# Patient Record
Sex: FEMALE | Race: WHITE | Employment: OTHER | URBAN - NONMETROPOLITAN AREA
[De-identification: names, ages, dates, MRNs, and addresses within clinical notes are randomized per-mention and may not be internally consistent; named-entity substitution may affect disease eponyms.]

---

## 2021-01-04 ENCOUNTER — APPOINTMENT (OUTPATIENT)
Dept: CT IMAGING | Age: 66
End: 2021-01-04
Payer: MEDICARE

## 2021-01-04 ENCOUNTER — HOSPITAL ENCOUNTER (EMERGENCY)
Age: 66
Discharge: ANOTHER HOSPITAL | End: 2021-01-04
Attending: EMERGENCY MEDICINE
Payer: MEDICARE

## 2021-01-04 ENCOUNTER — APPOINTMENT (OUTPATIENT)
Dept: GENERAL RADIOLOGY | Age: 66
End: 2021-01-04
Payer: MEDICARE

## 2021-01-04 VITALS
RESPIRATION RATE: 18 BRPM | BODY MASS INDEX: 28.93 KG/M2 | HEART RATE: 57 BPM | HEIGHT: 66 IN | TEMPERATURE: 97.6 F | DIASTOLIC BLOOD PRESSURE: 81 MMHG | OXYGEN SATURATION: 97 % | WEIGHT: 180 LBS | SYSTOLIC BLOOD PRESSURE: 136 MMHG

## 2021-01-04 DIAGNOSIS — G93.89 BRAIN MASS: Primary | ICD-10-CM

## 2021-01-04 LAB
ANION GAP SERPL CALCULATED.3IONS-SCNC: 1 MMOL/L (ref 4–16)
BASOPHILS ABSOLUTE: 0 K/CU MM
BASOPHILS RELATIVE PERCENT: 0.2 % (ref 0–1)
BUN BLDV-MCNC: 11 MG/DL (ref 6–23)
CALCIUM SERPL-MCNC: 9.2 MG/DL (ref 8.3–10.6)
CHLORIDE BLD-SCNC: 103 MMOL/L (ref 99–110)
CO2: 37 MMOL/L (ref 21–32)
CREAT SERPL-MCNC: 1.1 MG/DL (ref 0.6–1.1)
DIFFERENTIAL TYPE: ABNORMAL
EOSINOPHILS ABSOLUTE: 0.1 K/CU MM
EOSINOPHILS RELATIVE PERCENT: 1.4 % (ref 0–3)
GFR AFRICAN AMERICAN: 60 ML/MIN/1.73M2
GFR NON-AFRICAN AMERICAN: 50 ML/MIN/1.73M2
GLUCOSE BLD-MCNC: 81 MG/DL (ref 70–99)
GLUCOSE BLD-MCNC: 82 MG/DL (ref 70–99)
HCT VFR BLD CALC: 40.4 % (ref 37–47)
HEMOGLOBIN: 13.3 GM/DL (ref 12.5–16)
IMMATURE NEUTROPHIL %: 0.2 % (ref 0–0.43)
INR BLD: 1.12 INDEX
LYMPHOCYTES ABSOLUTE: 1.4 K/CU MM
LYMPHOCYTES RELATIVE PERCENT: 29.4 % (ref 24–44)
MCH RBC QN AUTO: 34 PG (ref 27–31)
MCHC RBC AUTO-ENTMCNC: 32.9 % (ref 32–36)
MCV RBC AUTO: 103.3 FL (ref 78–100)
MONOCYTES ABSOLUTE: 0.3 K/CU MM
MONOCYTES RELATIVE PERCENT: 6.4 % (ref 0–4)
PDW BLD-RTO: 13 % (ref 11.7–14.9)
PLATELET # BLD: 198 K/CU MM (ref 140–440)
PMV BLD AUTO: 9.7 FL (ref 7.5–11.1)
POTASSIUM SERPL-SCNC: 4.1 MMOL/L (ref 3.5–5.1)
PROTHROMBIN TIME: 12.8 SECONDS (ref 11.7–14.5)
RBC # BLD: 3.91 M/CU MM (ref 4.2–5.4)
SEGMENTED NEUTROPHILS ABSOLUTE COUNT: 3 K/CU MM
SEGMENTED NEUTROPHILS RELATIVE PERCENT: 62.4 % (ref 36–66)
SODIUM BLD-SCNC: 141 MMOL/L (ref 135–145)
TOTAL IMMATURE NEUTOROPHIL: 0.01 K/CU MM
WBC # BLD: 4.8 K/CU MM (ref 4–10.5)

## 2021-01-04 PROCEDURE — 96375 TX/PRO/DX INJ NEW DRUG ADDON: CPT

## 2021-01-04 PROCEDURE — 2580000003 HC RX 258: Performed by: EMERGENCY MEDICINE

## 2021-01-04 PROCEDURE — 93005 ELECTROCARDIOGRAM TRACING: CPT | Performed by: EMERGENCY MEDICINE

## 2021-01-04 PROCEDURE — 93010 ELECTROCARDIOGRAM REPORT: CPT | Performed by: INTERNAL MEDICINE

## 2021-01-04 PROCEDURE — 6360000002 HC RX W HCPCS: Performed by: EMERGENCY MEDICINE

## 2021-01-04 PROCEDURE — 80048 BASIC METABOLIC PNL TOTAL CA: CPT

## 2021-01-04 PROCEDURE — 85610 PROTHROMBIN TIME: CPT

## 2021-01-04 PROCEDURE — 71045 X-RAY EXAM CHEST 1 VIEW: CPT

## 2021-01-04 PROCEDURE — 96365 THER/PROPH/DIAG IV INF INIT: CPT

## 2021-01-04 PROCEDURE — 70450 CT HEAD/BRAIN W/O DYE: CPT

## 2021-01-04 PROCEDURE — 82962 GLUCOSE BLOOD TEST: CPT

## 2021-01-04 PROCEDURE — 99282 EMERGENCY DEPT VISIT SF MDM: CPT

## 2021-01-04 PROCEDURE — 6360000004 HC RX CONTRAST MEDICATION: Performed by: EMERGENCY MEDICINE

## 2021-01-04 PROCEDURE — 85025 COMPLETE CBC W/AUTO DIFF WBC: CPT

## 2021-01-04 PROCEDURE — 70496 CT ANGIOGRAPHY HEAD: CPT

## 2021-01-04 RX ORDER — LORAZEPAM 2 MG/ML
1 INJECTION INTRAMUSCULAR ONCE
Status: COMPLETED | OUTPATIENT
Start: 2021-01-04 | End: 2021-01-04

## 2021-01-04 RX ORDER — DEXAMETHASONE SODIUM PHOSPHATE 4 MG/ML
4 INJECTION, SOLUTION INTRA-ARTICULAR; INTRALESIONAL; INTRAMUSCULAR; INTRAVENOUS; SOFT TISSUE ONCE
Status: COMPLETED | OUTPATIENT
Start: 2021-01-04 | End: 2021-01-04

## 2021-01-04 RX ADMIN — DEXAMETHASONE SODIUM PHOSPHATE 4 MG: 4 INJECTION, SOLUTION INTRAMUSCULAR; INTRAVENOUS at 12:18

## 2021-01-04 RX ADMIN — LEVETIRACETAM 1500 MG: 100 INJECTION, SOLUTION INTRAVENOUS at 12:25

## 2021-01-04 RX ADMIN — LORAZEPAM 1 MG: 2 INJECTION INTRAMUSCULAR; INTRAVENOUS at 12:19

## 2021-01-04 RX ADMIN — IOPAMIDOL 75 ML: 755 INJECTION, SOLUTION INTRAVENOUS at 11:55

## 2021-01-04 NOTE — ED NOTES
Mey Win here for transfer to [address] [place] [address].       John Clemente RN  01/04/21 0607

## 2021-01-04 NOTE — ED NOTES
Bed: 05  Expected date:   Expected time:   Means of arrival:   Comments:  EMS - 10 Brandi Denton, RN  01/04/21 1113

## 2021-01-04 NOTE — ED NOTES
[facility] on their way here.  Going to [facility] ED main Accepted by Dr. Salvador Maddox  01/04/21 6254

## 2021-01-04 NOTE — ED NOTES
Called [facility]  As patient is an employee here to get an emergency contact. Daija Dueñas was emergency contact that is listed and phone number is [phone]. This nurse attempted to call and message left to please call [phone].      Cliff Champagne RN  01/04/21 1625

## 2021-01-04 NOTE — ED PROVIDER NOTES
Triage Chief Complaint:   Altered Mental Status    [place]:  Raj Theodore is a 77 y.o. female that presents ED by EMS. 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 She follows commands denies any fall change or trauma she is on no antiplatelet antithrombin inhibitor    No past medical history on file. No past surgical history on file. No family history on file.   Social History     Socioeconomic History    Marital status:      Spouse name: Not on file    Number of children: Not on file    Years of education: Not on file    Highest education level: Not on file   Occupational History    Not on file   Social Needs    Financial resource strain: Not on file    Food insecurity     Worry: Not on file     Inability: Not on file    Transportation needs     Medical: Not on file     Non-medical: Not on file   Tobacco Use    Smoking status: Not on file   Substance and Sexual Activity    Alcohol use: Not on file    Drug use: Not on file    Sexual activity: Not on file   Lifestyle    Physical activity     Days per week: Not on file     Minutes per session: Not on file    Stress: Not on file   Relationships    Social connections     Talks on phone: Not on file     Gets together: Not on file     Attends Yazidi service: Not on file     Active member of club or organization: Not on file     Attends meetings of clubs or organizations: Not on file     Relationship status: Not on file    Intimate partner violence     Fear of current or ex partner: Not on file     Emotionally abused: Not on file     Physically abused: Not on file     Forced sexual activity: Not on file   Other Topics Concern    Not on file   Social History Narrative    Not on file     Current Facility-Administered Medications   Medication Dose Route Frequency Provider Last Rate Last Admin    levETIRAcetam (KEPPRA) 1,500 mg in sodium chloride 0.9 % 100 mL IVPB  1,500 mg Intravenous Once Darcy Chahal DO   1,500 mg at 01/04/21 1225     No current outpatient medications on file. Not on File      ROS:    Review of Systems   Neurological: Positive for facial asymmetry and weakness. Negative for dizziness, tremors, seizures, syncope, speech difficulty, light-headedness, numbness and headaches. All other systems reviewed and are negative. Nursing Notes Reviewed    Physical Exam:  ED Triage Vitals   Enc Vitals Group      BP       Pulse       Resp       Temp       Temp src       SpO2       Weight       Height       Head Circumference       Peak Flow       Pain Score       Pain Loc       Pain Edu? Excl. in [address]? Physical Exam  Vitals signs and nursing note reviewed. Constitutional:       General: She is in acute distress. Appearance: She is well-developed. She is ill-appearing and toxic-appearing. HENT:      Head: Normocephalic and atraumatic. Right Ear: External ear normal.      Left Ear: External ear normal.      Mouth/Throat:      Mouth: Mucous membranes are moist.   Eyes:      General: No visual field deficit or scleral icterus. Right eye: No discharge. Left eye: No discharge. Extraocular Movements:      Right eye: Normal extraocular motion. Conjunctiva/sclera: Conjunctivae normal.      Pupils: Pupils are equal, round, and reactive to light. Neck:      Musculoskeletal: Normal range of motion and neck supple. Thyroid: No thyromegaly. Vascular: No JVD. Trachea: No tracheal deviation. Cardiovascular:      Rate and Rhythm: Normal rate and regular rhythm. Heart sounds: Normal heart sounds. No murmur. No friction rub. No gallop.     Pulmonary:      Effort: Pulmonary effort is normal. No respiratory distress. Breath sounds: Normal breath sounds. No stridor. No wheezing or rales. Chest:      Chest wall: No tenderness. Abdominal:      General: Bowel sounds are normal. There is no distension. Palpations: Abdomen is soft. There is no mass. Tenderness: There is no abdominal tenderness. There is no guarding or rebound. Hernia: No hernia is present. Musculoskeletal: Normal range of motion. General: No tenderness or deformity. Lymphadenopathy:      Cervical: No cervical adenopathy. Skin:     General: Skin is warm and dry. Coloration: Skin is not pale. Findings: No erythema or rash. Neurological:      Mental Status: She is alert and oriented to person, place, and time. GCS: GCS eye subscore is 4. GCS verbal subscore is 5. GCS motor subscore is 6. Cranial Nerves: Cranial nerve deficit, dysarthria and facial asymmetry present. Sensory: Sensory deficit present. Motor: Weakness present. Deep Tendon Reflexes: Reflexes are normal and symmetric.  Reflexes normal.      Comments: Milka Morrow NIH Stroke Scale at 11:26 AM is:  Level of Consciousness:  0 - alert; keenly responsive    LOC Questions:  0 - answers both questions correctly    LOC Commands:  0 - performs both tasks correctly    Best Gaze:  0 - normal    Visual Fields:  0 - no visual loss    Facial Palsy:  2 - partial paralysis (total or near total paralysis of the lower face)    Motor-Arm-Left:  0 - no drift, limb holds 90 (or 45) degrees for full 10 seconds    Motor-Leg-Left:  0 - no drift; leg holds 30 degree position for full 5 seconds    Motor-Arm-Right:  0 - no drift, limb holds 90 (or 45) degrees for full 10 seconds    Motor-Leg-Right:  0 - no drift; leg holds 30 degree position for full 5 seconds    Limb Ataxia:  0 - absent    Sensory:  1 - mild to moderate sensory loss; patient feels pinprick is less sharp or is dull on the affected side; there is a loss of superficial pain with pinprick but patient is aware of being touched     Best Language:  0 - no aphasia, normal    Dysarthria:  1 - mild to moderate, patient slurs at least some words and at worst, can be understood with some difficulty    Extinction and Inattention:  0 - no abnormality     Psychiatric:         Mood and Affect: Mood normal.         Speech: Speech is delayed and slurred. Behavior: Behavior normal.         Thought Content: Thought content normal.         Judgment: Judgment normal.         I have reviewed and interpreted all of the currently available lab results from this visit (ifapplicable):  Results for orders placed or performed during the hospital encounter of 01/04/21   POCT Glucose   Result Value Ref Range    POC Glucose 81 70 - 99 MG/DL   EKG 12 Lead   Result Value Ref Range    Ventricular Rate 57 BPM    Atrial Rate 57 BPM    P-R Interval 188 ms    QRS Duration 108 ms    Q-T Interval 432 ms    QTc Calculation (Bazett) 420 ms    P Axis 79 degrees    R Axis -6 degrees    T Axis 20 degrees    Diagnosis       Sinus bradycardia  Possible Inferior infarct , age undetermined  Abnormal ECG  No previous ECGs available        Radiographs (if obtained):  [] The following radiograph wasinterpreted by myself in the absence of a radiologist:   [] Radiologist's Report Reviewed:  CTA HEAD NECK W CONTRAST   Preliminary Result   No acute arterial abnormality or hemodynamically significant stenosis in the   head or neck. Ectatic ascending thoracic aorta measuring 4.4 cm in diameter. XR CHEST PORTABLE   Final Result   Peripheral bilateral pulmonary opacities with bibasilar hypoaeration could   represent subsegmental atelectasis or atypical pneumonia         CT HEAD WO CONTRAST   Preliminary Result   1. Left frontal lobe mass measures 3.2 x 3.1 x 2.9 cm with surrounding   vasogenic edema. Differential diagnosis includes primary brain neoplasm or   metastatic disease.   MRI of the brain without and with contrast recommended   for further characterization. 2. No acute intracranial hemorrhage. Critical results were called by Dr. Maricel Hollingsworth MD to Manfred Hoffmann on 1/4/2021 at 11:52. EKG (if obtained): (All EKG's are interpreted by myself in the absence of a cardiologist)      The 12 lead EKG was interpreted by me, and the interpretation is as follows:  normal sinus rhythm and sinus bradycardia, rate=57, rate = 57. Intervals are within the normal range. QTc is not prolonged. ST elevations are not present. T wave inversions are not present. Non-specific T wave changes are not present. Delta waves, Brugada Syndrome, and Short VA are not present. There is no acute ischemia. Chart review shows recent radiographs:  No results found. MDM:    Presents to the ED with a change in mental status when I saw her she was slow to respond and had an extra score for me of 4. The patient could follow command she could speak it was understandable but slightly slurred. Stat CT of the head unfortunately with a left frontal lobe mass some vasogenic edema and slight shift. CTA is negative. I spoke to the stroke network physician at [facility]. Patient did have a little chewing meat of her chin concerning for bulbar symptoms for seizure she was given a dose of lorazepam loaded with Keppra. The patient was given 4 mg of Decadron and will be transferred to [facility] for definitive care    The total Critical Care time is 60 minutes which excludes separately billable procedures. ED Course as of Jan 04 1231   [facility] Jan 04, 2021   1150 Call from RAD>>>    [PW]   1150 Call from RAD>>> ? Mass L frontal lobe; minimal midline shift. [PW]      ED Course User Index  [PW] Leah Nguyen DO         Clinical Impression:  1. Brain mass      Disposition referral (if applicable):  No follow-up provider specified.   Disposition medications (if applicable):  New Prescriptions    No medications on file           Amytine Jamil Ivan DO, FACEP      Comment: Please note this report has been produced using speech recognition software and maycontain errors related to that system including errors in grammar, punctuation, and spelling, as well as words and phrases that may be inappropriate. If there are any questions or concerns please feel free to contact thedictating provider for clarification.         Mack Stevens DO  01/04/21 6190

## 2021-01-04 NOTE — ED NOTES
Pt making chewing motions with her mouth. Asked if she had chewing gum. Pt indicated no.        Jackelyn Brumfield RN  01/04/21 4337

## 2021-01-07 LAB
EKG ATRIAL RATE: 57 BPM
EKG DIAGNOSIS: NORMAL
EKG P AXIS: 79 DEGREES
EKG P-R INTERVAL: 188 MS
EKG Q-T INTERVAL: 432 MS
EKG QRS DURATION: 108 MS
EKG QTC CALCULATION (BAZETT): 420 MS
EKG R AXIS: -6 DEGREES
EKG T AXIS: 20 DEGREES
EKG VENTRICULAR RATE: 57 BPM

## 2022-05-11 ENCOUNTER — APPOINTMENT (OUTPATIENT)
Dept: CT IMAGING | Age: 67
End: 2022-05-11
Payer: COMMERCIAL

## 2022-05-11 ENCOUNTER — APPOINTMENT (OUTPATIENT)
Dept: GENERAL RADIOLOGY | Age: 67
End: 2022-05-11
Payer: COMMERCIAL

## 2022-05-11 ENCOUNTER — HOSPITAL ENCOUNTER (OUTPATIENT)
Age: 67
Setting detail: OBSERVATION
Discharge: HOSPICE | End: 2022-05-12
Attending: EMERGENCY MEDICINE
Payer: COMMERCIAL

## 2022-05-11 DIAGNOSIS — S22.41XA CLOSED FRACTURE OF MULTIPLE RIBS OF RIGHT SIDE, INITIAL ENCOUNTER: ICD-10-CM

## 2022-05-11 DIAGNOSIS — C71.9 GLIOBLASTOMA MULTIFORME (HCC): ICD-10-CM
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
## 2022-05-11 PROBLEM — S22.39XA CLOSED FRACTURE OF ONE RIB: Status: ACTIVE | Noted: 2022-05-11

## 2022-05-11 PROBLEM — R26.9 GAIT DIFFICULTY: Status: ACTIVE | Noted: 2021-01-26

## 2022-05-11 PROBLEM — I48.0 PAROXYSMAL ATRIAL FIBRILLATION (HCC): Status: ACTIVE | Noted: 2022-05-11

## 2022-05-11 PROBLEM — D49.6 BRAIN TUMOR (HCC): Status: ACTIVE | Noted: 2021-01-04

## 2022-05-11 PROBLEM — R53.0 NEOPLASTIC MALIGNANT RELATED FATIGUE: Status: ACTIVE | Noted: 2021-07-11

## 2022-05-11 PROBLEM — R47.01 APHASIA DUE TO NEURO-ONCOLOGY DIAGNOSIS: Status: ACTIVE | Noted: 2021-01-26

## 2022-05-11 PROBLEM — I71.20 THORACIC AORTIC ANEURYSM WITHOUT RUPTURE: Status: ACTIVE | Noted: 2021-04-20

## 2022-05-11 PROBLEM — R91.8 LUNG MASS: Status: ACTIVE | Noted: 2021-01-04

## 2022-05-11 LAB
ALBUMIN SERPL-MCNC: 4.2 GM/DL (ref 3.4–5)
ALP BLD-CCNC: 50 IU/L (ref 40–129)
ALT SERPL-CCNC: 13 U/L (ref 10–40)
ANION GAP SERPL CALCULATED.3IONS-SCNC: 9 MMOL/L (ref 4–16)
AST SERPL-CCNC: 11 IU/L (ref 15–37)
BASOPHILS ABSOLUTE: 0 K/CU MM
BASOPHILS RELATIVE PERCENT: 0.1 % (ref 0–1)
BILIRUB SERPL-MCNC: 0.4 MG/DL (ref 0–1)
BUN BLDV-MCNC: 17 MG/DL (ref 6–23)
CALCIUM SERPL-MCNC: 9 MG/DL (ref 8.3–10.6)
CHLORIDE BLD-SCNC: 107 MMOL/L (ref 99–110)
CO2: 27 MMOL/L (ref 21–32)
CREAT SERPL-MCNC: 0.6 MG/DL (ref 0.6–1.1)
DIFFERENTIAL TYPE: ABNORMAL
EOSINOPHILS ABSOLUTE: 0.1 K/CU MM
EOSINOPHILS RELATIVE PERCENT: 0.7 % (ref 0–3)
GFR AFRICAN AMERICAN: >60 ML/MIN/1.73M2
GFR NON-AFRICAN AMERICAN: >60 ML/MIN/1.73M2
GLUCOSE BLD-MCNC: 97 MG/DL (ref 70–99)
HCT VFR BLD CALC: 43.3 % (ref 37–47)
HEMOGLOBIN: 14.4 GM/DL (ref 12.5–16)
IMMATURE NEUTROPHIL %: 0.8 % (ref 0–0.43)
LYMPHOCYTES ABSOLUTE: 1 K/CU MM
LYMPHOCYTES RELATIVE PERCENT: 10.9 % (ref 24–44)
MCH RBC QN AUTO: 33 PG (ref 27–31)
MCHC RBC AUTO-ENTMCNC: 33.3 % (ref 32–36)
MCV RBC AUTO: 99.1 FL (ref 78–100)
MONOCYTES ABSOLUTE: 0.7 K/CU MM
MONOCYTES RELATIVE PERCENT: 7.6 % (ref 0–4)
PDW BLD-RTO: 12.5 % (ref 11.7–14.9)
PLATELET # BLD: 179 K/CU MM (ref 140–440)
PMV BLD AUTO: 9.4 FL (ref 7.5–11.1)
POTASSIUM SERPL-SCNC: 3.8 MMOL/L (ref 3.5–5.1)
RBC # BLD: 4.37 M/CU MM (ref 4.2–5.4)
SEGMENTED NEUTROPHILS ABSOLUTE COUNT: 7.1 K/CU MM
SEGMENTED NEUTROPHILS RELATIVE PERCENT: 79.9 % (ref 36–66)
SODIUM BLD-SCNC: 143 MMOL/L (ref 135–145)
TOTAL IMMATURE NEUTOROPHIL: 0.07 K/CU MM
TOTAL PROTEIN: 6.3 GM/DL (ref 6.4–8.2)
WBC # BLD: 8.9 K/CU MM (ref 4–10.5)

## 2022-05-11 PROCEDURE — 96375 TX/PRO/DX INJ NEW DRUG ADDON: CPT

## 2022-05-11 PROCEDURE — 72131 CT LUMBAR SPINE W/O DYE: CPT

## 2022-05-11 PROCEDURE — 85025 COMPLETE CBC W/AUTO DIFF WBC: CPT

## 2022-05-11 PROCEDURE — 6370000000 HC RX 637 (ALT 250 FOR IP): Performed by: NURSE PRACTITIONER

## 2022-05-11 PROCEDURE — 80053 COMPREHEN METABOLIC PANEL: CPT

## 2022-05-11 PROCEDURE — G0378 HOSPITAL OBSERVATION PER HR: HCPCS

## 2022-05-11 PROCEDURE — 71045 X-RAY EXAM CHEST 1 VIEW: CPT

## 2022-05-11 PROCEDURE — 96361 HYDRATE IV INFUSION ADD-ON: CPT

## 2022-05-11 PROCEDURE — 96372 THER/PROPH/DIAG INJ SC/IM: CPT

## 2022-05-11 PROCEDURE — 96374 THER/PROPH/DIAG INJ IV PUSH: CPT

## 2022-05-11 PROCEDURE — 72125 CT NECK SPINE W/O DYE: CPT

## 2022-05-11 PROCEDURE — 2580000003 HC RX 258: Performed by: NURSE PRACTITIONER

## 2022-05-11 PROCEDURE — 99285 EMERGENCY DEPT VISIT HI MDM: CPT

## 2022-05-11 PROCEDURE — 72128 CT CHEST SPINE W/O DYE: CPT

## 2022-05-11 PROCEDURE — 70450 CT HEAD/BRAIN W/O DYE: CPT

## 2022-05-11 PROCEDURE — 6360000002 HC RX W HCPCS: Performed by: EMERGENCY MEDICINE

## 2022-05-11 PROCEDURE — 6360000002 HC RX W HCPCS: Performed by: NURSE PRACTITIONER

## 2022-05-11 PROCEDURE — 2580000003 HC RX 258: Performed by: EMERGENCY MEDICINE

## 2022-05-11 RX ORDER — DEXAMETHASONE 2 MG/1
2 TABLET ORAL 2 TIMES DAILY WITH MEALS
Status: ON HOLD | COMMUNITY
End: 2022-05-12 | Stop reason: SDUPTHER

## 2022-05-11 RX ORDER — APIXABAN 5 MG/1
TABLET, FILM COATED ORAL
COMMUNITY
Start: 2022-05-06

## 2022-05-11 RX ORDER — ONDANSETRON 2 MG/ML
4 INJECTION INTRAMUSCULAR; INTRAVENOUS EVERY 6 HOURS PRN
Status: DISCONTINUED | OUTPATIENT
Start: 2022-05-11 | End: 2022-05-11

## 2022-05-11 RX ORDER — ENOXAPARIN SODIUM 100 MG/ML
40 INJECTION SUBCUTANEOUS DAILY
Status: DISCONTINUED | OUTPATIENT
Start: 2022-05-11 | End: 2022-05-11

## 2022-05-11 RX ORDER — ONDANSETRON 2 MG/ML
4 INJECTION INTRAMUSCULAR; INTRAVENOUS EVERY 6 HOURS PRN
Status: DISCONTINUED | OUTPATIENT
Start: 2022-05-11 | End: 2022-05-12 | Stop reason: HOSPADM

## 2022-05-11 RX ORDER — MORPHINE SULFATE 4 MG/ML
4 INJECTION, SOLUTION INTRAMUSCULAR; INTRAVENOUS ONCE
Status: COMPLETED | OUTPATIENT
Start: 2022-05-11 | End: 2022-05-11

## 2022-05-11 RX ORDER — LANOLIN ALCOHOL/MO/W.PET/CERES
3 CREAM (GRAM) TOPICAL NIGHTLY PRN
Status: DISCONTINUED | OUTPATIENT
Start: 2022-05-11 | End: 2022-05-12 | Stop reason: HOSPADM

## 2022-05-11 RX ORDER — 0.9 % SODIUM CHLORIDE 0.9 %
1000 INTRAVENOUS SOLUTION INTRAVENOUS ONCE
Status: COMPLETED | OUTPATIENT
Start: 2022-05-11 | End: 2022-05-11

## 2022-05-11 RX ORDER — MORPHINE SULFATE 2 MG/ML
1 INJECTION, SOLUTION INTRAMUSCULAR; INTRAVENOUS EVERY 4 HOURS PRN
Status: DISCONTINUED | OUTPATIENT
Start: 2022-05-11 | End: 2022-05-12

## 2022-05-11 RX ORDER — DEXAMETHASONE 2 MG/1
2 TABLET ORAL 2 TIMES DAILY WITH MEALS
Status: DISCONTINUED | OUTPATIENT
Start: 2022-05-11 | End: 2022-05-11

## 2022-05-11 RX ORDER — LORAZEPAM 2 MG/ML
2 INJECTION INTRAMUSCULAR EVERY 4 HOURS PRN
Status: DISCONTINUED | OUTPATIENT
Start: 2022-05-11 | End: 2022-05-12

## 2022-05-11 RX ORDER — LANOLIN ALCOHOL/MO/W.PET/CERES
3 CREAM (GRAM) TOPICAL NIGHTLY PRN
COMMUNITY

## 2022-05-11 RX ORDER — LEVOTHYROXINE SODIUM 0.1 MG/1
100 TABLET ORAL DAILY
Status: DISCONTINUED | OUTPATIENT
Start: 2022-05-11 | End: 2022-05-12 | Stop reason: HOSPADM

## 2022-05-11 RX ORDER — LORAZEPAM 2 MG/ML
1 INJECTION INTRAMUSCULAR EVERY 6 HOURS PRN
Status: DISCONTINUED | OUTPATIENT
Start: 2022-05-11 | End: 2022-05-12

## 2022-05-11 RX ORDER — MORPHINE SULFATE 2 MG/ML
2 INJECTION, SOLUTION INTRAMUSCULAR; INTRAVENOUS EVERY 4 HOURS PRN
Status: DISCONTINUED | OUTPATIENT
Start: 2022-05-11 | End: 2022-05-12

## 2022-05-11 RX ORDER — POLYETHYLENE GLYCOL 3350 17 G/17G
17 POWDER, FOR SOLUTION ORAL DAILY PRN
Status: DISCONTINUED | OUTPATIENT
Start: 2022-05-11 | End: 2022-05-12 | Stop reason: HOSPADM

## 2022-05-11 RX ORDER — SODIUM CHLORIDE 0.9 % (FLUSH) 0.9 %
5-40 SYRINGE (ML) INJECTION EVERY 12 HOURS SCHEDULED
Status: DISCONTINUED | OUTPATIENT
Start: 2022-05-11 | End: 2022-05-12 | Stop reason: HOSPADM

## 2022-05-11 RX ORDER — DEXAMETHASONE 2 MG/1
2 TABLET ORAL DAILY
Status: DISCONTINUED | OUTPATIENT
Start: 2022-05-12 | End: 2022-05-12 | Stop reason: HOSPADM

## 2022-05-11 RX ORDER — ACETAMINOPHEN 325 MG/1
650 TABLET ORAL EVERY 6 HOURS PRN
Status: DISCONTINUED | OUTPATIENT
Start: 2022-05-11 | End: 2022-05-12 | Stop reason: HOSPADM

## 2022-05-11 RX ORDER — ACETAMINOPHEN 325 MG/1
650 TABLET ORAL EVERY 6 HOURS PRN
Status: ON HOLD | COMMUNITY
End: 2022-05-12

## 2022-05-11 RX ORDER — LEVETIRACETAM 500 MG/1
500 TABLET ORAL 2 TIMES DAILY
COMMUNITY

## 2022-05-11 RX ORDER — MORPHINE SULFATE 4 MG/ML
4 INJECTION, SOLUTION INTRAMUSCULAR; INTRAVENOUS EVERY 4 HOURS PRN
Status: DISCONTINUED | OUTPATIENT
Start: 2022-05-11 | End: 2022-05-12

## 2022-05-11 RX ORDER — OXYCODONE HYDROCHLORIDE 10 MG/1
30 TABLET ORAL EVERY 6 HOURS PRN
Status: DISCONTINUED | OUTPATIENT
Start: 2022-05-11 | End: 2022-05-12

## 2022-05-11 RX ORDER — ONDANSETRON 4 MG/1
4 TABLET, ORALLY DISINTEGRATING ORAL EVERY 8 HOURS PRN
Status: DISCONTINUED | OUTPATIENT
Start: 2022-05-11 | End: 2022-05-12 | Stop reason: HOSPADM

## 2022-05-11 RX ORDER — LEVOTHYROXINE SODIUM 0.1 MG/1
100 TABLET ORAL DAILY
COMMUNITY

## 2022-05-11 RX ORDER — SODIUM CHLORIDE 9 MG/ML
250 INJECTION, SOLUTION INTRAVENOUS PRN
Status: DISCONTINUED | OUTPATIENT
Start: 2022-05-11 | End: 2022-05-11

## 2022-05-11 RX ORDER — ACETAMINOPHEN 650 MG/1
650 SUPPOSITORY RECTAL EVERY 6 HOURS PRN
Status: DISCONTINUED | OUTPATIENT
Start: 2022-05-11 | End: 2022-05-12 | Stop reason: HOSPADM

## 2022-05-11 RX ORDER — SODIUM CHLORIDE 0.9 % (FLUSH) 0.9 %
5-40 SYRINGE (ML) INJECTION PRN
Status: DISCONTINUED | OUTPATIENT
Start: 2022-05-11 | End: 2022-05-12 | Stop reason: HOSPADM

## 2022-05-11 RX ORDER — LEVETIRACETAM 500 MG/1
500 TABLET ORAL 2 TIMES DAILY
Status: DISCONTINUED | OUTPATIENT
Start: 2022-05-11 | End: 2022-05-12 | Stop reason: HOSPADM

## 2022-05-11 RX ADMIN — ONDANSETRON 4 MG: 2 INJECTION INTRAMUSCULAR; INTRAVENOUS at 12:46

## 2022-05-11 RX ADMIN — SODIUM CHLORIDE, PRESERVATIVE FREE 10 ML: 5 INJECTION INTRAVENOUS at 20:06

## 2022-05-11 RX ADMIN — MORPHINE SULFATE 4 MG: 4 INJECTION INTRAVENOUS at 12:42

## 2022-05-11 RX ADMIN — SODIUM CHLORIDE 250 ML: 9 INJECTION, SOLUTION INTRAVENOUS at 17:04

## 2022-05-11 RX ADMIN — SODIUM CHLORIDE 1000 ML: 9 INJECTION, SOLUTION INTRAVENOUS at 12:46

## 2022-05-11 RX ADMIN — APIXABAN 5 MG: 5 TABLET, FILM COATED ORAL at 20:07

## 2022-05-11 RX ADMIN — DEXAMETHASONE 2 MG: 2 TABLET ORAL at 20:07

## 2022-05-11 RX ADMIN — LEVETIRACETAM 500 MG: 500 TABLET, FILM COATED ORAL at 20:07

## 2022-05-11 RX ADMIN — ENOXAPARIN SODIUM 40 MG: 100 INJECTION SUBCUTANEOUS at 17:07

## 2022-05-11 RX ADMIN — LEVETIRACETAM 500 MG: 500 INJECTION, SOLUTION INTRAVENOUS at 17:06

## 2022-05-11 RX ADMIN — ACETAMINOPHEN 650 MG: 325 TABLET ORAL at 20:07

## 2022-05-11 ASSESSMENT — PAIN SCALES - GENERAL
PAINLEVEL_OUTOF10: 8
PAINLEVEL_OUTOF10: 3

## 2022-05-11 ASSESSMENT — PAIN DESCRIPTION - LOCATION
LOCATION: BACK
LOCATION: BACK

## 2022-05-11 ASSESSMENT — LIFESTYLE VARIABLES: HOW OFTEN DO YOU HAVE A DRINK CONTAINING ALCOHOL: NEVER

## 2022-05-11 ASSESSMENT — PAIN - FUNCTIONAL ASSESSMENT
PAIN_FUNCTIONAL_ASSESSMENT: ACTIVITIES ARE NOT PREVENTED
PAIN_FUNCTIONAL_ASSESSMENT: 0-10

## 2022-05-11 ASSESSMENT — PAIN DESCRIPTION - ORIENTATION: ORIENTATION: MID

## 2022-05-11 ASSESSMENT — PAIN DESCRIPTION - DESCRIPTORS: DESCRIPTORS: ACHING;DISCOMFORT

## 2022-05-11 NOTE — H&P
V2.0  History and Physical      Name:  Sarah Vargas /Age/Sex: 1955  (74 y.o. female)   MRN & CSN:  [id number] & [id number] Encounter Date/Time: 2022 2:53 PM EDT   Location:  [facility] PCP: No primary care provider on file. Hospital Day: 1    Assessment and Plan:   Sarah Vargas is a 74 y.o. female with a pmh of  who presents with Closed fracture of one rib    Hospital Problems           Last Modified POA    * (Principal) Closed fracture of one rib 2022 Yes          1. Fall resulting in rib fractures  and  will control pain  2. Glioblastoma stage 4 on hospice care with [facility], hospice has been consulted for inpatient care  3. History of seizures, nursing is attempting to get medications from pharmacy however in the meantime we will start Keppra 500 mg IV twice daily  4. Patient has significant expressive aphasia, will consult speech therapy. 5. Hypothyroidism continue synthroid  6. PAF, continue Eliquis for now, may need to be d/cd if pt continues to fall.     Swallow evaluation was completed by myself and RN patient did swallow water fine she is unable to swallow food and pills, she will be placed on a diet I will order her p.o. medications at this time. Further plans once family arrives and are able to discuss patient's wishes and plans. Also further planning depending on hospice consultation recommendations.     Disposition:   Current Living situation: Home alone  Expected Disposition: TBD  Estimated D/C: TBD    Diet Diet NPO until speech eval   DVT Prophylaxis [x] Lovenox, []  Heparin, [] SCDs, [] Ambulation,  [] Eliquis, [] Xarelto   Code Status Full Code   Surrogate Decision Maker/ POA Ramos Pinto son  [phone]   Remy Hiss daughter-n-law [phone]  History from:     Patients family    History of Present Illness:     Chief Complaint:  rib fractures  Sarah Vargas is a very pleasant 74 y.o. female with pmh of glioblastoma stage IV who presents with fall resulting in fracture of the 11th and 12th ribs. At this time we are uncertain of any other past medical history as her family has not arrived at this time. I did speak to her son who lives in [place] and he will be here tomorrow. I also spoke with daughter-in-law who lives in [place] and she states she will be driving up here but able to take a few days. Patient presented to the emergency department after a fall with acute on chronic back pain. Patient has baseline aphasia as well as right-sided neurodeficits from her intracranial cancer. Patient does live alone and attempts to use cane when ambulating. Patient is currently receiving hospice care for her GBM she plans no further chemo or radiation, she is a DNR CC this all has been discussed with her family who are power of . Review of Systems: Need 10 Elements     10 point review of systems is unobtainable as patient cannot speak. Objective:   No intake or output data in the 24 hours ending 05/11/22 1453   Vitals:   Vitals:    05/11/22 1057 05/11/22 1059 05/11/22 1350 05/11/22 1400   BP: (!) 156/88  114/63 118/66   Pulse: 72      Resp: 18      Temp: 98.2 °F (36.8 °C)      TempSrc: Oral      SpO2: 98%  100% 100%   Weight: 180 lb (81.6 kg) 135 lb (61.2 kg)         Medications Prior to Admission     Prior to Admission medications    Medication Sig Start Date End Date Taking? Authorizing Provider   ELIQUIS 5 MG TABS tablet TAKE 1 TABLET BY MOUTH TWICE A DAY 5/6/22   Historical Provider, MD       Physical Exam: Need 8 Elements       General: NAD awake and alert able to answer yes/no questions appropriately  Eyes: EOMI  ENT: neck supple  Cardiovascular: Regular rate. Respiratory: Clear to auscultation  Gastrointestinal: Soft, non tender  Genitourinary: no suprapubic tenderness  Musculoskeletal: No edema patient has right-sided hemiparesis secondary to glioblastoma  Skin: warm, dry  Neuro: Alert.   Able to answer yes no questions appropriately  Psych: Mood appropriate. Past Medical History:   PMHx   Past Medical History:   Diagnosis Date    Brain tumor [facility])     affects her speach      PSHX: Brain surgery for her glioblastoma  Allergies: No Known Allergies  Fam HX: Patient cannot tell me any family history. Soc HX:   Social History     Socioeconomic History    Marital status:      Spouse name: None    Number of children: None    Years of education: None    Highest education level: None   Occupational History    None   Tobacco Use    Smoking status: Never Smoker    Smokeless tobacco: Never Used   Substance and Sexual Activity    Alcohol use: Never    Drug use: None    Sexual activity: None   Other Topics Concern    None   Social History Narrative    None     Social Determinants of Health     Financial Resource Strain:     Difficulty of Paying Living Expenses: Not on file   Food Insecurity:     Worried About Running Out of Food in the Last Year: Not on file    Mickie of Food in the Last Year: Not on file   Transportation Needs:     Lack of Transportation (Medical): Not on file    Lack of Transportation (Non-Medical):  Not on file   Physical Activity:     Days of Exercise per Week: Not on file    Minutes of Exercise per Session: Not on file   Stress:     Feeling of Stress : Not on file   Social Connections:     Frequency of Communication with Friends and Family: Not on file    Frequency of Social Gatherings with Friends and Family: Not on file    Attends Yazidi Services: Not on file    Active Member of Clubs or Organizations: Not on file    Attends Club or Organization Meetings: Not on file    Marital Status: Not on file   Intimate Partner Violence:     Fear of Current or Ex-Partner: Not on file    Emotionally Abused: Not on file    Physically Abused: Not on file    Sexually Abused: Not on file   Housing Stability:     Unable to Pay for Housing in the Last Year: Not on file    Number of [place] in the Last Year: Not on file    Unstable Housing in the Last Year: Not on file       Medications:   Medications:    sodium chloride flush  5-40 mL IntraVENous 2 times per day    levetiracetam  500 mg IntraVENous Q12H    enoxaparin  40 mg SubCUTAneous Daily      Infusions:    sodium chloride       PRN Meds: sodium chloride flush, 5-40 mL, PRN  sodium chloride, 250 mL, PRN  ondansetron, 4 mg, Q8H PRN   Or  ondansetron, 4 mg, Q6H PRN  polyethylene glycol, 17 g, Daily PRN  acetaminophen, 650 mg, Q6H PRN   Or  acetaminophen, 650 mg, Q6H PRN  morphine, 2 mg, Q4H PRN  morphine, 4 mg, Q4H PRN  morphine, 1 mg, Q4H PRN  LORazepam, 1 mg, Q6H PRN  LORazepam, 2 mg, Q4H PRN        Labs      CBC:   Recent Labs     05/11/22  1130   WBC 8.9   HGB 14.4        BMP:    Recent Labs     05/11/22  1130      K 3.8      CO2 27   BUN 17   CREATININE 0.6   GLUCOSE 97     Hepatic:   Recent Labs     05/11/22  1130   AST 11*   BILITOT 0.4   ALKPHOS 50         Imaging/Diagnostics Last 24 Hours   CT HEAD WO CONTRAST    Result Date: 5/11/2022  EXAMINATION: CT OF THE CERVICAL SPINE WITHOUT CONTRAST; CT OF THE LUMBAR SPINE WITHOUT CONTRAST; CT OF THE THORACIC SPINE WITHOUT CONTRAST; CT OF THE HEAD WITHOUT CONTRAST 5/11/2022 8:30 am; 5/11/2022 8:31 am TECHNIQUE: CT of the cervical spine was performed without the administration of intravenous contrast. Multiplanar reformatted images are provided for review. Automated exposure control, iterative reconstruction, and/or weight based adjustment of the mA/kV was utilized to reduce the radiation dose to as low as reasonably achievable.; CT of the lumbar spine was performed without the administration of intravenous contrast. Multiplanar reformatted images are provided for review. Adjustment of mA and/or kV according to patient size was utilized.   Automated exposure control, iterative reconstruction, and/or weight based adjustment of the mA/kV was utilized to reduce the radiation dose to as low as reasonably achievable.; CT of the thoracic spine was performed without the administration of intravenous contrast. Multiplanar reformatted images are provided for review. Automated exposure control, iterative reconstruction, and/or weight based adjustment of the mA/kV was utilized to reduce the radiation dose to as low as reasonably achievable.; CT of the head was performed without the administration of intravenous contrast. Automated exposure control, iterative reconstruction, and/or weight based adjustment of the mA/kV was utilized to reduce the radiation dose to as low as reasonably achievable. COMPARISON: None. HISTORY: ORDERING SYSTEM PROVIDED HISTORY: fall from chair TECHNOLOGIST PROVIDED HISTORY: Reason for exam:->fall from chair Decision Support Exception - unselect if not a suspected or confirmed emergency medical condition->Emergency Medical Condition (MA) Reason for Exam: fall from chair Additional signs and symptoms: fall from chair; ORDERING SYSTEM PROVIDED HISTORY: fall from chair TECHNOLOGIST PROVIDED HISTORY: Reason for exam:->fall from chair Reason for Exam: fall from chair Additional signs and symptoms: fall from chair; [facility]: fall, slid from chair, on eliquis, known GBM that is recurrent status post bx recently TECHNOLOGIST PROVIDED HISTORY: Reason for exam:->fall, slid from chair, on eliquis, known GBM that is recurrent status post bx recently Has a \"code stroke\" or \"stroke alert\" been called? ->No Decision Support Exception - unselect if not a suspected or confirmed emergency medical condition->Emergency Medical Condition (MA) Reason for Exam: fall, slid from chair, on eliquis, known GBM that is recurrent status post bx recently Additional signs and symptoms: fall, slid from chair, on eliquis, known GBM that is recurrent status post bx recently FINDINGS: HEAD CT: BRAIN/VENTRICLES:  No acute loss of the gray-white matter differentiation is identified to suggest acute or subacute infarct. Encephalomalacia in the left frontal lobe is noted. Hypoattenuation within the left frontal lobe is noted as well, along with vasogenic edema, in keeping with the patient's known history of glioblastoma. No evidence of midline shift. There is mild periventricular low-attenuation, compatible with chronic small vessel ischemic disease. The intracranial vasculature, including the dural venous sinuses, is within normal limits. ORBITS: No acute orbital abnormalities are identified. SINUSES: The visualized paranasal sinuses and mastoid air cells are clear. SOFT TISSUES/SKULL: Left calvarial craniotomy noted. No acute calvarial abnormality detected. CERVICAL SPINE: BONES/ALIGNMENT: No acute fracture or traumatic malalignment. DEGENERATIVE CHANGES: Multilevel degenerative disc and facet disease noted. No high-grade central canal stenosis is found. SOFT TISSUES: There is no prevertebral soft tissue swelling. THORACIC SPINE: BONES/ALIGNMENT: No acute fracture or traumatic malalignment is seen within the thoracic spine itself. There are acute fractures in the right 11th and 12th ribs posteriorly. DEGENERATIVE CHANGES: No significant degenerative disease is seen. SOFT TISSUES: No paravertebral edema. Paraspinal muscles are symmetric. There is a spiculated mass seen within the periphery of the left lower lobe measuring at least 2.2 cm. That is not completely included on the field of view. Visualized right lung is clear. LUMBAR SPINE: BONES/ALIGNMENT: No acute fracture or traumatic malalignment is seen within the lumbar spine DEGENERATIVE CHANGES: There is mild degenerative disc disease, seen greatest L2-L3, where there is a small circumferential disc bulge. No central or foraminal stenosis. SOFT TISSUES: No paravertebral edema is identified. 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      Head CT: Left frontal hypoattenuation along with vasogenic edema, in keeping with the patient's known history of glioblastoma. No acute intracranial abnormality detected. Cervical spine CT: No acute fracture or traumatic malalignment. Thoracic spine CT: No acute fracture within the thoracic spine itself. However, there are acute fractures of the right 11th and 12th ribs posteriorly. There is a spiculated mass which is partially included on the field of view measuring at least 2.2 cm within the periphery of the left lower lobe. If that has not previously been worked up, a chest CT would be recommended at this time. Lumbar spine CT: No acute fracture or traumatic malalignment. CT CERVICAL SPINE WO CONTRAST    Result Date: 5/11/2022  EXAMINATION: CT OF THE CERVICAL SPINE WITHOUT CONTRAST; CT OF THE LUMBAR SPINE WITHOUT CONTRAST; CT OF THE THORACIC SPINE WITHOUT CONTRAST; CT OF THE HEAD WITHOUT CONTRAST 5/11/2022 8:30 am; 5/11/2022 8:31 am TECHNIQUE: CT of the cervical spine was performed without the administration of intravenous contrast. Multiplanar reformatted images are provided for review. Automated exposure control, iterative reconstruction, and/or weight based adjustment of the mA/kV was utilized to reduce the radiation dose to as low as reasonably achievable.; CT of the lumbar spine was performed without the administration of intravenous contrast. Multiplanar reformatted images are provided for review. Adjustment of mA and/or kV according to patient size was utilized. Automated exposure control, iterative reconstruction, and/or weight based adjustment of the mA/kV was utilized to reduce the radiation dose to as low as reasonably achievable.; CT of the thoracic spine was performed without the administration of intravenous contrast. Multiplanar reformatted images are provided for review.  Automated exposure control, iterative reconstruction, and/or weight based adjustment of the mA/kV was utilized to reduce the radiation dose to as low as reasonably achievable.; CT of the head was performed without the administration of intravenous contrast. Automated exposure control, iterative reconstruction, and/or weight based adjustment of the mA/kV was utilized to reduce the radiation dose to as low as reasonably achievable. COMPARISON: None. HISTORY: ORDERING SYSTEM PROVIDED HISTORY: fall from chair TECHNOLOGIST PROVIDED HISTORY: Reason for exam:->fall from chair Decision Support Exception - unselect if not a suspected or confirmed emergency medical condition->Emergency Medical Condition (MA) Reason for Exam: fall from chair Additional signs and symptoms: fall from chair; ORDERING SYSTEM PROVIDED HISTORY: fall from chair TECHNOLOGIST PROVIDED HISTORY: Reason for exam:->fall from chair Reason for Exam: fall from chair Additional signs and symptoms: fall from chair; [address] [facility]: fall, slid from chair, on eliquis, known GBM that is recurrent status post bx recently TECHNOLOGIST PROVIDED HISTORY: Reason for exam:->fall, slid from chair, on eliquis, known GBM that is recurrent status post bx recently Has a \"code stroke\" or \"stroke alert\" been called? ->No Decision Support Exception - unselect if not a suspected or confirmed emergency medical condition->Emergency Medical Condition (MA) Reason for Exam: fall, slid from chair, on eliquis, known GBM that is recurrent status post bx recently Additional signs and symptoms: fall, slid from chair, on eliquis, known GBM that is recurrent status post bx recently FINDINGS: HEAD CT: BRAIN/VENTRICLES:  No acute loss of the gray-white matter differentiation is identified to suggest acute or subacute infarct. Encephalomalacia in the left frontal lobe is noted. Hypoattenuation within the left frontal lobe is noted as well, along with vasogenic edema, in keeping with the patient's known history of glioblastoma. No evidence of midline shift. There is mild periventricular low-attenuation, compatible with chronic small vessel ischemic disease.  The intracranial vasculature, including the dural venous sinuses, is within normal limits. ORBITS: No acute orbital abnormalities are identified. SINUSES: The visualized paranasal sinuses and mastoid air cells are clear. SOFT TISSUES/SKULL: Left calvarial craniotomy noted. No acute calvarial abnormality detected. CERVICAL SPINE: BONES/ALIGNMENT: No acute fracture or traumatic malalignment. DEGENERATIVE CHANGES: Multilevel degenerative disc and facet disease noted. No high-grade central canal stenosis is found. SOFT TISSUES: There is no prevertebral soft tissue swelling. THORACIC SPINE: BONES/ALIGNMENT: No acute fracture or traumatic malalignment is seen within the thoracic spine itself. There are acute fractures in the right 11th and 12th ribs posteriorly. DEGENERATIVE CHANGES: No significant degenerative disease is seen. SOFT TISSUES: No paravertebral edema. Paraspinal muscles are symmetric. There is a spiculated mass seen within the periphery of the left lower lobe measuring at least 2.2 cm. That is not completely included on the field of view. Visualized right lung is clear. LUMBAR SPINE: BONES/ALIGNMENT: No acute fracture or traumatic malalignment is seen within the lumbar spine DEGENERATIVE CHANGES: There is mild degenerative disc disease, seen greatest L2-L3, where there is a small circumferential disc bulge. No central or foraminal stenosis. SOFT TISSUES: No paravertebral edema is identified. 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 Head CT: Left frontal hypoattenuation along with vasogenic edema, in keeping with the patient's known history of glioblastoma. No acute intracranial abnormality detected. Cervical spine CT: No acute fracture or traumatic malalignment. Thoracic spine CT: No acute fracture within the thoracic spine itself. However, there are acute fractures of the right 11th and 12th ribs posteriorly.  There is a spiculated mass which is partially included on the field of view measuring at least 2.2 cm within the periphery of the left lower lobe. If that has not previously been worked up, a chest CT would be recommended at this time. Lumbar spine CT: No acute fracture or traumatic malalignment. CT THORACIC SPINE WO CONTRAST    Result Date: 5/11/2022  EXAMINATION: CT OF THE CERVICAL SPINE WITHOUT CONTRAST; CT OF THE LUMBAR SPINE WITHOUT CONTRAST; CT OF THE THORACIC SPINE WITHOUT CONTRAST; CT OF THE HEAD WITHOUT CONTRAST 5/11/2022 8:30 am; 5/11/2022 8:31 am TECHNIQUE: CT of the cervical spine was performed without the administration of intravenous contrast. Multiplanar reformatted images are provided for review. Automated exposure control, iterative reconstruction, and/or weight based adjustment of the mA/kV was utilized to reduce the radiation dose to as low as reasonably achievable.; CT of the lumbar spine was performed without the administration of intravenous contrast. Multiplanar reformatted images are provided for review. Adjustment of mA and/or kV according to patient size was utilized. Automated exposure control, iterative reconstruction, and/or weight based adjustment of the mA/kV was utilized to reduce the radiation dose to as low as reasonably achievable.; CT of the thoracic spine was performed without the administration of intravenous contrast. Multiplanar reformatted images are provided for review. Automated exposure control, iterative reconstruction, and/or weight based adjustment of the mA/kV was utilized to reduce the radiation dose to as low as reasonably achievable.; CT of the head was performed without the administration of intravenous contrast. Automated exposure control, iterative reconstruction, and/or weight based adjustment of the mA/kV was utilized to reduce the radiation dose to as low as reasonably achievable. COMPARISON: None.  HISTORY: ORDERING SYSTEM PROVIDED HISTORY: fall from chair TECHNOLOGIST PROVIDED HISTORY: Reason for exam:->fall from chair Decision Support Exception - unselect if not a suspected or confirmed emergency medical condition->Emergency Medical Condition (MA) Reason for Exam: fall from chair Additional signs and symptoms: fall from chair; ORDERING SYSTEM PROVIDED HISTORY: fall from chair TECHNOLOGIST PROVIDED HISTORY: Reason for exam:->fall from chair Reason for Exam: fall from chair Additional signs and symptoms: fall from chair; 1200 [facility]: fall, slid from chair, on eliquis, known GBM that is recurrent status post bx recently TECHNOLOGIST PROVIDED HISTORY: Reason for exam:->fall, slid from chair, on eliquis, known GBM that is recurrent status post bx recently Has a \"code stroke\" or \"stroke alert\" been called? ->No Decision Support Exception - unselect if not a suspected or confirmed emergency medical condition->Emergency Medical Condition (MA) Reason for Exam: fall, slid from chair, on eliquis, known GBM that is recurrent status post bx recently Additional signs and symptoms: fall, slid from chair, on eliquis, known GBM that is recurrent status post bx recently FINDINGS: HEAD CT: BRAIN/VENTRICLES:  No acute loss of the gray-white matter differentiation is identified to suggest acute or subacute infarct. Encephalomalacia in the left frontal lobe is noted. Hypoattenuation within the left frontal lobe is noted as well, along with vasogenic edema, in keeping with the patient's known history of glioblastoma. No evidence of midline shift. There is mild periventricular low-attenuation, compatible with chronic small vessel ischemic disease. The intracranial vasculature, including the dural venous sinuses, is within normal limits. ORBITS: No acute orbital abnormalities are identified. SINUSES: The visualized paranasal sinuses and mastoid air cells are clear. SOFT TISSUES/SKULL: Left calvarial craniotomy noted. No acute calvarial abnormality detected.  CERVICAL SPINE: BONES/ALIGNMENT: No acute fracture or traumatic malalignment. DEGENERATIVE CHANGES: Multilevel degenerative disc and facet disease noted. No high-grade central canal stenosis is found. SOFT TISSUES: There is no prevertebral soft tissue swelling. THORACIC SPINE: BONES/ALIGNMENT: No acute fracture or traumatic malalignment is seen within the thoracic spine itself. There are acute fractures in the right 11th and 12th ribs posteriorly. DEGENERATIVE CHANGES: No significant degenerative disease is seen. SOFT TISSUES: No paravertebral edema. Paraspinal muscles are symmetric. There is a spiculated mass seen within the periphery of the left lower lobe measuring at least 2.2 cm. That is not completely included on the field of view. Visualized right lung is clear. LUMBAR SPINE: BONES/ALIGNMENT: No acute fracture or traumatic malalignment is seen within the lumbar spine DEGENERATIVE CHANGES: There is mild degenerative disc disease, seen greatest L2-L3, where there is a small circumferential disc bulge. No central or foraminal stenosis. SOFT TISSUES: No paravertebral edema is identified. 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 Head CT: Left frontal hypoattenuation along with vasogenic edema, in keeping with the patient's known history of glioblastoma. No acute intracranial abnormality detected. Cervical spine CT: No acute fracture or traumatic malalignment. Thoracic spine CT: No acute fracture within the thoracic spine itself. However, there are acute fractures of the right 11th and 12th ribs posteriorly. There is a spiculated mass which is partially included on the field of view measuring at least 2.2 cm within the periphery of the left lower lobe. If that has not previously been worked up, a chest CT would be recommended at this time. Lumbar spine CT: No acute fracture or traumatic malalignment.      CT LUMBAR SPINE WO CONTRAST    Result Date: 5/11/2022  EXAMINATION: CT OF THE CERVICAL SPINE WITHOUT CONTRAST; CT OF THE LUMBAR SPINE WITHOUT CONTRAST; CT OF THE THORACIC SPINE WITHOUT CONTRAST; CT OF THE HEAD WITHOUT CONTRAST 5/11/2022 8:30 am; 5/11/2022 8:31 am TECHNIQUE: CT of the cervical spine was performed without the administration of intravenous contrast. Multiplanar reformatted images are provided for review. Automated exposure control, iterative reconstruction, and/or weight based adjustment of the mA/kV was utilized to reduce the radiation dose to as low as reasonably achievable.; CT of the lumbar spine was performed without the administration of intravenous contrast. Multiplanar reformatted images are provided for review. Adjustment of mA and/or kV according to patient size was utilized. Automated exposure control, iterative reconstruction, and/or weight based adjustment of the mA/kV was utilized to reduce the radiation dose to as low as reasonably achievable.; CT of the thoracic spine was performed without the administration of intravenous contrast. Multiplanar reformatted images are provided for review. Automated exposure control, iterative reconstruction, and/or weight based adjustment of the mA/kV was utilized to reduce the radiation dose to as low as reasonably achievable.; CT of the head was performed without the administration of intravenous contrast. Automated exposure control, iterative reconstruction, and/or weight based adjustment of the mA/kV was utilized to reduce the radiation dose to as low as reasonably achievable. COMPARISON: None.  HISTORY: ORDERING SYSTEM PROVIDED HISTORY: fall from chair TECHNOLOGIST PROVIDED HISTORY: Reason for exam:->fall from chair Decision Support Exception - unselect if not a suspected or confirmed emergency medical condition->Emergency Medical Condition (MA) Reason for Exam: fall from chair Additional signs and symptoms: fall from chair; ORDERING SYSTEM PROVIDED HISTORY: fall from chair TECHNOLOGIST PROVIDED HISTORY: Reason for exam:->fall from chair Reason for Exam: fall from chair Additional signs and symptoms: fall from chair; ORDERING SYSTEM PROVIDED HISTORY: fall, slid from chair, on eliquis, known GBM that is recurrent status post bx recently TECHNOLOGIST PROVIDED HISTORY: Reason for exam:->fall, slid from chair, on eliquis, known GBM that is recurrent status post bx recently Has a \"code stroke\" or \"stroke alert\" been called? ->No Decision Support Exception - unselect if not a suspected or confirmed emergency medical condition->Emergency Medical Condition (MA) Reason for Exam: fall, slid from chair, on eliquis, known GBM that is recurrent status post bx recently Additional signs and symptoms: fall, slid from chair, on eliquis, known GBM that is recurrent status post bx recently FINDINGS: HEAD CT: BRAIN/VENTRICLES:  No acute loss of the gray-white matter differentiation is identified to suggest acute or subacute infarct. Encephalomalacia in the left frontal lobe is noted. Hypoattenuation within the left frontal lobe is noted as well, along with vasogenic edema, in keeping with the patient's known history of glioblastoma. No evidence of midline shift. There is mild periventricular low-attenuation, compatible with chronic small vessel ischemic disease. The intracranial vasculature, including the dural venous sinuses, is within normal limits. ORBITS: No acute orbital abnormalities are identified. SINUSES: The visualized paranasal sinuses and mastoid air cells are clear. SOFT TISSUES/SKULL: Left calvarial craniotomy noted. No acute calvarial abnormality detected. CERVICAL SPINE: BONES/ALIGNMENT: No acute fracture or traumatic malalignment. DEGENERATIVE CHANGES: Multilevel degenerative disc and facet disease noted. No high-grade central canal stenosis is found. SOFT TISSUES: There is no prevertebral soft tissue swelling. THORACIC SPINE: BONES/ALIGNMENT: No acute fracture or traumatic malalignment is seen within the thoracic spine itself.  There are acute fractures in the right 11th and 12th ribs posteriorly. DEGENERATIVE CHANGES: No significant degenerative disease is seen. SOFT TISSUES: No paravertebral edema. Paraspinal muscles are symmetric. There is a spiculated mass seen within the periphery of the left lower lobe measuring at least 2.2 cm. That is not completely included on the field of view. Visualized right lung is clear. LUMBAR SPINE: BONES/ALIGNMENT: No acute fracture or traumatic malalignment is seen within the lumbar spine DEGENERATIVE CHANGES: There is mild degenerative disc disease, seen greatest L2-L3, where there is a small circumferential disc bulge. No central or foraminal stenosis. SOFT TISSUES: No paravertebral edema is identified. 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 Head CT: Left frontal hypoattenuation along with vasogenic edema, in keeping with the patient's known history of glioblastoma. No acute intracranial abnormality detected. Cervical spine CT: No acute fracture or traumatic malalignment. Thoracic spine CT: No acute fracture within the thoracic spine itself. However, there are acute fractures of the right 11th and 12th ribs posteriorly. There is a spiculated mass which is partially included on the field of view measuring at least 2.2 cm within the periphery of the left lower lobe. If that has not previously been worked up, a chest CT would be recommended at this time. Lumbar spine CT: No acute fracture or traumatic malalignment. XR CHEST PORTABLE    Result Date: 5/11/2022  EXAMINATION: ONE XRAY VIEW OF THE CHEST 5/11/2022 10:23 am COMPARISON: 01/04/2021 HISTORY: ORDERING SYSTEM PROVIDED HISTORY: fall, R sided rib fx on ct TECHNOLOGIST PROVIDED HISTORY: Reason for exam:->fall, R sided rib fx on ct Reason for Exam: fall, R sided rib fx on ct Additional signs and symptoms: fall, R sided rib fx on ct FINDINGS: The right-sided rib fracture detected on the CT is not well seen on the radiograph.   No other acute bony abnormalities are identified. A pneumothorax is not identified. No focal infiltrate. Cardial pericardial silhouette unremarkable. The rib fractures seen on the thoracic spine CT are not well visualized with radiography. In any event, no pneumothorax is seen. No other bony abnormalities are detected.        Personally reviewed Lab Studies, Imaging, and discussed case with Dr Rafi Aguayo    Electronically signed by YOLANDA Gusman NP on 5/11/2022 at 2:53 PM

## 2022-05-11 NOTE — PROGRESS NOTES
Spoke with pt's son to notify of admission and verify home medication list. Iraida Walter (son)  [phone] with Angeles TOVAR from [facility] to notify of hospice consult and admission to hospital.

## 2022-05-11 NOTE — ED PROVIDER NOTES
Triage Chief Complaint:   Fall (slid out of chair today. Denies hitting head and denies LOC ) and Back Pain (chronic back pain but hurting worse after fall )    [place]:  Chasidy Gar is a 74 y.o. female that presents after a fall with acute on chronic back pain. Patient reports that she slid out of her chair landing on her buttock and low back. Patient reports primarily low back pain but some upper back pain as well. Patient denies any head injury or loss of consciousness. Low back pain is currently severe, constant and diffuse around the low back with some more mild pain to the upper back. Patient has baseline aphasia as well as right-sided neurodeficits from her known intracranial cancer. Patient does report that she lives alone and does use a cane to ambulate at baseline. Patient is working with hospice right now given her GBM. No plans for any further chemo or radiation or surgery. Patient is with some significant expressive aphasia which does limit history some.     ROS:  General:  No fevers, no chills, no weakness  Eyes:  No recent vison changes, no discharge  ENT:  No difficulty swallowing, no blood from nose, no hearing changes  Cardiovascular:  No chest pain, no palpitations  Respiratory:  No shortness of breath, no coughing up blood, no wheezing  Gastrointestinal:  No pain, no nausea, no vomiting, no diarrhea  Musculoskeletal:  No muscle pain, no joint pain, + back pain  Skin:  No rash, no cuts, no easy bruising  Neurologic:  + chronic speech problems, no headache, + chronic extremity numbness, no extremity tingling, + chronic extremity weakness  Psychiatric:  No anxiety  Genitourinary:  No dysuria, no hematuria  Extremities:  no edema, no pain    Past Medical History:   Diagnosis Date    Brain tumor [facility])     affects her speach     Closed fracture of 2 ribs of right side 5/11/2022    Fall at home, right 11-12 posterior rib fractures    Expressive aphasia     Glioblastoma multiforme ([facility] [address] 01/12/2021    [facility], chemoradiation, with recurrent disease March 2022    Hypothyroidism     Paroxysmal atrial fibrillation ([facility] [address] 5/11/2022    Per [facility] records     Past Surgical History:   Procedure Laterality Date    CATARACT REMOVAL      CRANIOTOMY Left 01/12/2021    left frontoparietal crani with resection at [facility], followed by chemoradiation    CRANIOTOMY Left 03/09/2022    recurrent GBM    CYSTOURETHROSCOPY      HYSTERECTOMY  2017     Family History   Problem Relation Age of Onset    Hypertension Father      Social History     Socioeconomic History    Marital status:      Spouse name: Not on file    Number of children: 2    Years of education: Not on file    Highest education level: Not on file   Occupational History    Not on file   Tobacco Use    Smoking status: Never Smoker    Smokeless tobacco: Never Used   Substance and Sexual Activity    Alcohol use: Never    Drug use: Not on file    Sexual activity: Not on file   Other Topics Concern    Not on file   Social History Narrative    Not on file     Social Determinants of Health     Financial Resource Strain:     Difficulty of Paying Living Expenses: Not on file   Food Insecurity:     Worried About [address] in the Last Year: Not on file    [address] in the Last Year: Not on file   Transportation Needs:     Lack of Transportation (Medical): Not on file    Lack of Transportation (Non-Medical):  Not on file   Physical Activity:     Days of Exercise per Week: Not on file    Minutes of Exercise per Session: Not on file   Stress:     Feeling of Stress : Not on file   Social Connections:     Frequency of Communication with Friends and Family: Not on file    Frequency of Social Gatherings with Friends and Family: Not on file    Attends L[employer] Services: Not on file    Active Member of Clubs or Organizations: Not on file    Attends Club or Organization Meetings: Not on file    Marital Status: Not on file   Intimate Partner Violence:     Fear of Current or Ex-Partner: Not on file    Emotionally Abused: Not on file    Physically Abused: Not on file    Sexually Abused: Not on file   Housing Stability:     Unable to Pay for Housing in the Last Year: Not on file    Number of Nithya in the Last Year: Not on file    Unstable Housing in the Last Year: Not on file     Current Facility-Administered Medications   Medication Dose Route Frequency Provider Last Rate Last Admin    oxyCODONE (ROXICODONE) immediate release tablet 5 mg  5 mg Oral Q6H PRN Caitlin Gaitan MD        sodium chloride flush 0.9 % injection 5-40 mL  5-40 mL IntraVENous 2 times per day YOLANDA Arana NP   10 mL at 22 08    sodium chloride flush 0.9 % injection 5-40 mL  5-40 mL IntraVENous PRN YOLANDA Arana NP        ondansetron (ZOFRAN-ODT) disintegrating tablet 4 mg  4 mg Oral Q8H PRN YOLANDA Arana NP        Or    ondansetron (ZOFRAN) injection 4 mg  4 mg IntraVENous Q6H PRN Kenny Woo APRN - MARVIN        polyethylene glycol (GLYCOLAX) packet 17 g  17 g Oral Daily PRN Kenny Woo APRN - MAR[place]IN        acetaminophen (TYLENOL) tablet 650 mg  650 mg Oral Q6H PRN YOLANDA Arana - NP   650 mg at 22    Or    acetaminophen (TYLENOL) suppository 650 mg  650 mg Rectal Q6H PRN Kenny Woo APRN - NP        apixaban [place]) tablet 5 mg  5 mg Oral BID YOLANDA Arana - NP   5 mg at 22 08    levETIRAcetam (KEPPRA) tablet 500 mg  500 mg Oral BID Kenny Woo APRN - NP   500 mg at 22 08    levothyroxine (SYNTHROID) tablet 100 mcg  100 mcg Oral Daily YOLANDA Arana NP   100 mcg at 22    melatonin tablet 3 mg  3 mg Oral Nightly PRN Kenny Woo APRN - MAR[place]IN        dexamethasone (DECADRON) tablet 2 mg  2 mg Oral Daily Caitlin Gaitan MD   2 mg at 22 08     No Known Allergies    Nursing Notes Reviewed    Physical Exam:  ED Triage Vitals [22 1057]   Enc Vitals Group      BP (!) 156/88      Pulse 72      Resp 18 Temp 98.2 °F (36.8 °C)      Temp Source Oral      SpO2 98 %      Weight 180 lb (81.6 kg)      Height       Head Circumference       Peak Flow       Pain Score       Pain Loc       Pain Edu? Excl. in [address]? My pulse ox interpretation is - 98% on RA    General appearance:  No acute distress. Appears older than stated age. Skin:  Warm. Dry. No contusions or abrasions noted to exposed skin  Eye:  Extraocular movements intact. Ears, nose, mouth and throat:  No cephalohematoma, abreu sign or raccoon eyes. Midface is stable. No dental malocclusion. Neck:  Trachea midline. No midline bony cervical tenderness. Extremity:  No swelling. Normal ROM. No gross deformity ×4 extremities. Extremities are nontender. Heart:  Regular rate and rhythm, normal S1 & S2, no extra heart sounds. Perfusion:  Intact. Respiratory:  Lungs clear to auscultation bilaterally. Respirations nonlabored. Chest wall is nontender. No crepitance. Abdominal:  Normal bowel sounds. Soft. Nontender. Non distended. Back:  No midline bony TLS tenderness or step-off. Neurological:  Alert and oriented times 3. Expressive aphasia is noted per patient's baseline. Right arm and right leg weakness is noted as well as some sensory deficit on the right when compared to left which patient reports is also baseline. Normal strength and sensation to left arm and left leg. Symmetric brow raise and nasolabial folds. Tongue midline.           Psychiatric:  Appropriate    I have reviewed and interpreted all of the currently available lab results from this visit (if applicable):  Results for orders placed or performed during the hospital encounter of 05/11/22   CBC with Auto Differential   Result Value Ref Range    WBC 8.9 4.0 - 10.5 K/CU MM    RBC 4.37 4.2 - 5.4 M/CU MM    Hemoglobin 14.4 12.5 - 16.0 GM/DL    Hematocrit 43.3 37 - 47 %    MCV 99.1 78 - 100 FL    MCH 33.0 (H) 27 - 31 PG    MCHC 33.3 32.0 - 36.0 %    RDW 12.5 11.7 - 14.9 %    Platelets 991 896 - 246 K/CU MM    MPV 9.4 7.5 - 11.1 FL    Differential Type AUTOMATED DIFFERENTIAL     Segs Relative 79.9 (H) 36 - 66 %    Lymphocytes % 10.9 (L) 24 - 44 %    Monocytes % 7.6 (H) 0 - 4 %    Eosinophils % 0.7 0 - 3 %    Basophils % 0.1 0 - 1 %    Segs Absolute 7.1 K/CU MM    Lymphocytes Absolute 1.0 K/CU MM    Monocytes Absolute 0.7 K/CU MM    Eosinophils Absolute 0.1 K/CU MM    Basophils Absolute 0.0 K/CU MM    Immature Neutrophil % 0.8 (H) 0 - 0.43 %    Total Immature Neutrophil 0.07 K/CU MM   Comprehensive Metabolic Panel w/ Reflex to MG   Result Value Ref Range    Sodium 143 135 - 145 MMOL/L    Potassium 3.8 3.5 - 5.1 MMOL/L    Chloride 107 99 - 110 mMol/L    CO2 27 21 - 32 MMOL/L    BUN 17 6 - 23 MG/DL    CREATININE 0.6 0.6 - 1.1 MG/DL    Glucose 97 70 - 99 MG/DL    Calcium 9.0 8.3 - 10.6 MG/DL    Albumin 4.2 3.4 - 5.0 GM/DL    Total Protein 6.3 (L) 6.4 - 8.2 GM/DL    Total Bilirubin 0.4 0.0 - 1.0 MG/DL    ALT 13 10 - 40 U/L    AST 11 (L) 15 - 37 IU/L    Alkaline Phosphatase 50 40 - 129 IU/L    GFR Non-African American >60 >60 mL/min/1.73m2    GFR African American >60 >60 mL/min/1.73m2    Anion Gap 9 4 - 16      Radiographs (if obtained):  [] The following radiograph was interpreted by myself in the absence of a radiologist:   [x] Radiologist's Report Reviewed:  XR CHEST PORTABLE   Final Result   The rib fractures seen on the thoracic spine CT are not well visualized with   radiography. In any event, no pneumothorax is seen. No other bony   abnormalities are detected. CT HEAD WO CONTRAST   Final Result   Head CT: Left frontal hypoattenuation along with vasogenic edema, in keeping   with the patient's known history of glioblastoma. No acute intracranial   abnormality detected. Cervical spine CT: No acute fracture or traumatic malalignment. Thoracic spine CT: No acute fracture within the thoracic spine itself.       However, there are acute fractures of the right 11th and 12th ribs   posteriorly. There is a spiculated mass which is partially included on the field of view   measuring at least 2.2 cm within the periphery of the left lower lobe. If   that has not previously been worked up, a chest CT would be recommended at   this time. Lumbar spine CT: No acute fracture or traumatic malalignment. CT LUMBAR SPINE WO CONTRAST   Final Result   Head CT: Left frontal hypoattenuation along with vasogenic edema, in keeping   with the patient's known history of glioblastoma. No acute intracranial   abnormality detected. Cervical spine CT: No acute fracture or traumatic malalignment. Thoracic spine CT: No acute fracture within the thoracic spine itself. However, there are acute fractures of the right 11th and 12th ribs   posteriorly. There is a spiculated mass which is partially included on the field of view   measuring at least 2.2 cm within the periphery of the left lower lobe. If   that has not previously been worked up, a chest CT would be recommended at   this time. Lumbar spine CT: No acute fracture or traumatic malalignment. CT THORACIC SPINE WO CONTRAST   Final Result   Head CT: Left frontal hypoattenuation along with vasogenic edema, in keeping   with the patient's known history of glioblastoma. No acute intracranial   abnormality detected. Cervical spine CT: No acute fracture or traumatic malalignment. Thoracic spine CT: No acute fracture within the thoracic spine itself. However, there are acute fractures of the right 11th and 12th ribs   posteriorly. There is a spiculated mass which is partially included on the field of view   measuring at least 2.2 cm within the periphery of the left lower lobe. If   that has not previously been worked up, a chest CT would be recommended at   this time. Lumbar spine CT: No acute fracture or traumatic malalignment.          CT CERVICAL SPINE WO CONTRAST   Final Result   Head CT: Left frontal hypoattenuation along with vasogenic edema, in keeping   with the patient's known history of glioblastoma. No acute intracranial   abnormality detected. Cervical spine CT: No acute fracture or traumatic malalignment. Thoracic spine CT: No acute fracture within the thoracic spine itself. However, there are acute fractures of the right 11th and 12th ribs   posteriorly. There is a spiculated mass which is partially included on the field of view   measuring at least 2.2 cm within the periphery of the left lower lobe. If   that has not previously been worked up, a chest CT would be recommended at   this time. Lumbar spine CT: No acute fracture or traumatic malalignment. EKG (if obtained): (All EKG's are interpreted by myself in the absence of a cardiologist)    Chart review shows recent radiographs:  No results found. MDM:  Pt presents as above. Emergent conditions considered. Presentation prompted initial labs and imaging. IVs established IV morphine IV Zofran given. Patient kept NPO. CT imaging of patient's head, CT LS spines is pursued given patient's fall. CT imaging is negative for neuraxial trauma however it does demonstrate right 11th and 12th ribs posteriorly. Chest x-ray is thus pursued and is negative for pneumothorax. No further x-ray findings of rib fractures noted. Given patient's debility, baseline neurodeficits and degree of pain decision made to admit the patient for further PT/OT and pain control. Further work-up is held as patient is a hospice patient and is with goals of care for comfort at this time only. Patient is discussed with hospitalist and patient is admitted to medicine for    I discussed specific signs and symptoms on when to return to the emergency department as well as the need for close outpatient follow-up. Questions sought and answered with the patient.  They voice understanding and agree with plan.        The care of this patient did occur during the COVID-19 pandemic. Clinical Impression:  1. Fall, initial encounter    2. Closed fracture of multiple ribs of right side, initial encounter    3. Glioblastoma multiforme ([facility] [address]      Disposition referral (if applicable):  No follow-up provider specified. Disposition medications (if applicable):  Current Discharge Medication List        START taking these medications    Details   oxyCODONE (ROXICODONE) 5 MG immediate release tablet Take 1-2 tablets by mouth every 6 hours as needed (moderate to severe pain) for up to 30 days. Qty: 1 tablet, Refills: 0    Comments: Reduce doses taken as pain becomes manageable  Associated Diagnoses: Closed fracture of multiple ribs of right side, initial encounter; Glioblastoma multiforme ([facility] [address]             Comment: Please note this report has been produced using speech recognition software and may contain errors related to that system including errors in grammar, punctuation, and spelling, as well as words and phrases that may be inappropriate. If there are any questions or concerns please feel free to contact the dictating provider for clarification.        Mary Lopez MD  05/12/22 0155

## 2022-05-11 NOTE — H&P
[address] [place] [address]  General Inpatient History and Physical      Date: 5/11/2022  Name: Dandy Mascorro  MRN: [id number]  YOB: 1955  Admit Date: 5/11/2022 at [facility]  Primary Care: No primary care provider on file. Neurosurgery and Neuro Oncology at [facility]      Informant: the patient provides limited information due to expressive aphasia, the old records are reviewed. There are no family here. I collaborated with the patient's nurse and the hospice nurse, as well as the hospitalist group. I have reviewed the [facility] records from [facility]. Chief Complaint: fall at home with right 11-12 posterior rib fractures    History of Present Illness: Dandy Mascorro is a 74 y.o. 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 The patient had recurrent disease March 2022, and declined additional chemotherapy. She was referred for hospice services with start of hospice care March 24, 2022. Related comorbidities include expressive aphasia, gait abnormality, seizure disorder (evidently consisting of staring episodes per [facility] records). Additional medical illnesses include hypothyroidism, lung mass. The patient lives alone, with family checking in on her. She has a cane or a walker that she uses for ambulation. There was a fall a couple of weeks ago when she was putting trash in the garage without significant injury. The patient fell earlier today (May 11, 2022), and was sent to the emergency department and subsequently admitted by the hospitalist group. Hospice was not notified until after the patient was already in the inpatient unit. I was notified, and came to see her this evening. The patient is able to provide some information despite her expressive aphasia.   In the emergency department, she had multiple imaging studies that showed the left frontal mass, a spiculated left lower lobe mass, no fractures of the cervical, thoracic, lumbar spine, but there were right 11-12 posterior rib fracture. The patient was admitted by the hospitalist with concern regarding living alone. After hospice became aware of the admission, a phone call was made to the patient's daughter-in-law who is driving here from [place] and should arrive in the late afternoon on May 12, 2022. The hospice social worker provides information that the daughter-in-law will be staying with the patient. Arrangements will be made for hospital bed and continued hospice support with the goal for the patient to be at home. I saw the patient at about 7 PM on May 11, 2022. I have collaborated with the hospital nurse and the hospitalist nurse practitioner. Hospice will take over the medical care at [facility].  The patient is alert, smiling. She ate supper with no dysphagia. She currently denies any pain, except for a mild headache which is evidently chronic for her. She denies any dyspnea. There is no coughing. There Is no typical anginal symptoms. She reports her bowel movements are normal. There has been 50 pounds of weight loss over the past 16 months. Oncology History from [facility]  Glioblastoma, WHO Gr IV, MGMT Methylated, IDH1-R132H negative, ATRX intact   1/4/2021 Initial Diagnosis   Glioblastoma, WHO Gr IV, IDH1-R132H negative, ATRX intact    1/12/2021 Surgery   Left frontoparietal craniotomy resection of left frontal mass.     2/7/2021 - 3/2021 Chemotherapy   Temozolomide at 75mg/m2 days 1-42 while on concurrent RT    2/8/2021 - 3/2021 Radiation   6000 cGyover 30 fractions with TMZ    4/2021 - Chemotherapy   Adjuvant temozolomide-   Cycle 1 started on May 7-May 11  Cycle 2 started on 06/07/2021  Cycle 3 started on 07/12/2021  Cycle 4 - start 08/30/2021  Cycle 5- start 10/4/21  Cycle Activity:     Days of Exercise per Week: Not on file    Minutes of Exercise per Session: Not on file   Stress:     Feeling of Stress : Not on file   Social Connections:     Frequency of Communication with Friends and Family: Not on file    Frequency of Social Gatherings with Friends and Family: Not on file    Attends Baptism Services: Not on file    Active Member of [address] [place] [address] [employer] or Organizations: Not on file    Attends Club or Organization Meetings: Not on file    Marital Status: Not on file   Intimate Partner Violence:     Fear of Current or Ex-Partner: Not on file    Emotionally Abused: Not on file    Physically Abused: Not on file    Sexually Abused: Not on file   Housing Stability:     Unable to Pay for Housing in the Last Year: Not on file    Number of [place] in the Last Year: Not on file    Unstable Housing in the Last Year: Not on file       Family History: family history includes Hypertension in her father. Parents are     Old Records:  reviewed. Advanced Directives:  DNR-comfort care    No Known Allergies    Medications - list of home medications reviewed   Prior to Admission medications    Medication Sig Start Date End Date Taking?  Authorizing Provider   levETIRAcetam (KEPPRA) 500 MG tablet Take 500 mg by mouth 2 times daily   Yes Historical Provider, MD   dexamethasone (DECADRON) 2 MG tablet Take 2 mg by mouth 2 times daily (with meals) for 3 days   Yes Historical Provider, MD   levothyroxine (SYNTHROID) 100 MCG tablet Take 100 mcg by mouth Daily   Yes Historical Provider, MD   melatonin 3 MG TABS tablet Take 3 mg by mouth nightly as needed   Yes Historical Provider, MD   acetaminophen (TYLENOL) 325 MG tablet Take 650 mg by mouth every 6 hours as needed for Pain   Yes Historical Provider, MD   ELIQUIS 5 MG TABS tablet TAKE 1 TABLET BY MOUTH TWICE A DAY 22   Historical Provider, MD     Weight:    Wt Readings from Last 3 Encounters:   22 130 lb (59 kg)   21 180 lb (81.6 kg)       Data reviewed 5/11/2022:  CT Brain, spine 5/11/22  Head CT: Left frontal hypoattenuation along with vasogenic edema, in keeping with the patient's known history of glioblastoma. No acute intracranial abnormality detected. Cervical spine CT: No acute fracture or traumatic malalignment. Thoracic spine CT: No acute fracture within the thoracic spine itself. However, there are acute fractures of the right 11th and 12th ribs posteriorly. There is a spiculated mass which is partially included on the field of view measuring at least 2.2 cm within the periphery of the left lower lobe. If that has not previously been worked up, a chest CT would be recommended at this time. Lumbar spine CT: No acute fracture or traumatic malalignment. Chest X-ray  5/11/22  The rib fractures seen on the thoracic spine CT are not well visualized with radiography. In any event, no pneumothorax is seen. No other bony abnormalities are detected.      Hepatic Function Panel:    Lab Results   Component Value Date    ALKPHOS 50 05/11/2022    ALT 13 05/11/2022    AST 11 05/11/2022    PROT 6.3 05/11/2022    BILITOT 0.4 05/11/2022    LABALBU 4.2 05/11/2022     CBC:   Recent Labs     05/11/22  1130   WBC 8.9   HGB 14.4   HCT 43.3   MCV 99.1        BMP:   Recent Labs     05/11/22  1130      K 3.8      CO2 27   BUN 17   CREATININE 0.6   GLUCOSE 97         ROS: As noted in [place], all other systems are limited due to the patient's clinical condition and expressive aphasia, but are reviewed as able with staff and the patient able and are negative or as follows:  Constitutional: No fever, chills, + decreased appetite, + weight loss  HEENT:  No acute visual changes, nasal drainage, mild headache  CV:  No chest pain, palpitations, VILLASENOR, PND  PULM:  No cough, shortness of breath, hemoptysis  GI:  No nausea, vomiting, diarrhea, hematemesis, melena, hematochezia  :  No dysuria, hematuria Musculoskeletal:  No edema,   Neuro: + right sided weakness, no recent seizures ([facility] records state that she had some staring episodes and was started on Keppra), gait abnormality with right sided weakness      Physical Exam:   /79   Pulse 79   Temp 96.8 °F (36 °C) (Infrared)   Resp 18   Ht 5' 5\" (1.651 m)   Wt 130 lb (59 kg)   SpO2 95%   BMI 21.63 kg/m²   General: Comfortable, slow, halting speech, but able to make her needs known, there is no acute distress  Skin: Sallow  HEENT: Mucous membranes are moist, sclerae are clear, palpable left craniotomy defect, pupils are equal, I do not see any nystagmus  Neck: No meningismus or thyromegaly  Chest: No bruising, there is some mild discomfort to palpation in the right posterior thorax   Heart: RRR, S1S2, no murmurs  Lungs:  equal breath sounds bilaterally, diminished at the bases, without rales, rhonchi   Abdomen: soft, bowel sounds present, no tenderness, nondistended,   Extremities:  No mottling, cyanosis, or edema, no palpable cords  Neurologic: there is right hemiparesis, more noticeable in the arm as the patient needs to lift her right arm with her left hand. Gait is not assessed. She does have difficulty raising the right leg off the bed. Plantar reflex is upgoing on the right. There is no clonus. There is expressive aphasia which is not new. Assesment/Plan:    1. Fall at home with right 11-12 posterior rib fractures secondary to gait abnormality related to glioblastoma. The patient is admitted to [address] [place] [address] for acute management of pain, and to arrange a safe discharge plan. The patient's daughter-in-law is driving from [place] and should arrive May 12, 2020 2 in the afternoon. Additional equipment is being ordered for the home. I anticipate discharge May 12 when the plan is in place. The patient is agreeable to this. Hospice will follow.  Will reorder her home Oxycodone prn.  2. Left frontoparietal glioblastoma multiforme diagnosed January 2021 with recurrent disease in March 2022, no longer pursuing cancer directed therapy. Continue hospice support. 3. Gait abnormality secondary to the above with right sided weakness  4. Expressive aphasia secondary to the glioblastoma and resection  5. Hypothyroidism on replacement  6. History of seizures, manifested by staring episodes, continue Keppra  7. Spiculated 2.2 cm left lower lobe mass, this was reported on [facility] records, we do not plan additional investigation  8. Paroxysmal atrial fibrillation per [facility] records, and was started on Eliquis  9. DNR comfort care      Patient Active Problem List   Diagnosis Code    Closed fracture of 2 ribs of right side S22.41XA    Aphasia due to neuro-oncology diagnosis R47.01    Brain tumor ([address] D49.6    Gait difficulty R26.9    Lung mass R91.8    Neoplastic malignant related fatigue R53.0    Thoracic aortic aneurysm without rupture ([address] I71.2    Paroxysmal atrial fibrillation ([address] I48.0        LIOR Aquino MD, [facility]  5/11/2022

## 2022-05-11 NOTE — CARE COORDINATION
ELIZABETH faxed referral information to [facility] as the patient was receiving in-home hospice services prior to admission. ELIZABETH also spoke with Dr. Isidro Perez with [facility] to notify him of the patient's admission and consult for hospice.

## 2022-05-12 VITALS
SYSTOLIC BLOOD PRESSURE: 113 MMHG | WEIGHT: 130 LBS | BODY MASS INDEX: 21.66 KG/M2 | OXYGEN SATURATION: 98 % | DIASTOLIC BLOOD PRESSURE: 78 MMHG | HEIGHT: 65 IN | HEART RATE: 61 BPM | RESPIRATION RATE: 16 BRPM | TEMPERATURE: 97.1 F

## 2022-05-12 PROBLEM — C71.9 GLIOBLASTOMA MULTIFORME (HCC): Status: ACTIVE | Noted: 2022-05-12

## 2022-05-12 PROCEDURE — G0378 HOSPITAL OBSERVATION PER HR: HCPCS

## 2022-05-12 PROCEDURE — 80048 BASIC METABOLIC PNL TOTAL CA: CPT

## 2022-05-12 PROCEDURE — 85025 COMPLETE CBC W/AUTO DIFF WBC: CPT

## 2022-05-12 PROCEDURE — 6370000000 HC RX 637 (ALT 250 FOR IP): Performed by: NURSE PRACTITIONER

## 2022-05-12 PROCEDURE — 6370000000 HC RX 637 (ALT 250 FOR IP): Performed by: FAMILY MEDICINE

## 2022-05-12 PROCEDURE — 2580000003 HC RX 258: Performed by: NURSE PRACTITIONER

## 2022-05-12 RX ORDER — DEXAMETHASONE 2 MG/1
2 TABLET ORAL
Qty: 1 TABLET | Refills: 0
Start: 2022-05-12

## 2022-05-12 RX ORDER — OXYCODONE HYDROCHLORIDE 5 MG/1
5-10 TABLET ORAL EVERY 6 HOURS PRN
Qty: 1 TABLET | Refills: 0
Start: 2022-05-12 | End: 2022-06-11

## 2022-05-12 RX ORDER — OXYCODONE HYDROCHLORIDE 5 MG/1
5 TABLET ORAL EVERY 6 HOURS PRN
Status: DISCONTINUED | OUTPATIENT
Start: 2022-05-12 | End: 2022-05-12 | Stop reason: HOSPADM

## 2022-05-12 RX ADMIN — APIXABAN 5 MG: 5 TABLET, FILM COATED ORAL at 08:02

## 2022-05-12 RX ADMIN — SODIUM CHLORIDE, PRESERVATIVE FREE 10 ML: 5 INJECTION INTRAVENOUS at 08:02

## 2022-05-12 RX ADMIN — DEXAMETHASONE 2 MG: 2 TABLET ORAL at 08:02

## 2022-05-12 RX ADMIN — LEVOTHYROXINE SODIUM 100 MCG: 0.1 TABLET ORAL at 06:13

## 2022-05-12 RX ADMIN — OXYCODONE 5 MG: 5 TABLET ORAL at 15:36

## 2022-05-12 RX ADMIN — LEVETIRACETAM 500 MG: 500 TABLET, FILM COATED ORAL at 08:02

## 2022-05-12 ASSESSMENT — PAIN DESCRIPTION - DESCRIPTORS: DESCRIPTORS: ACHING

## 2022-05-12 ASSESSMENT — PAIN DESCRIPTION - ORIENTATION: ORIENTATION: LOWER

## 2022-05-12 ASSESSMENT — PAIN DESCRIPTION - ONSET: ONSET: GRADUAL

## 2022-05-12 ASSESSMENT — PAIN SCALES - GENERAL: PAINLEVEL_OUTOF10: 6

## 2022-05-12 ASSESSMENT — PAIN DESCRIPTION - FREQUENCY: FREQUENCY: CONTINUOUS

## 2022-05-12 ASSESSMENT — PAIN DESCRIPTION - DIRECTION: RADIATING_TOWARDS: NO

## 2022-05-12 ASSESSMENT — PAIN - FUNCTIONAL ASSESSMENT: PAIN_FUNCTIONAL_ASSESSMENT: ACTIVITIES ARE NOT PREVENTED

## 2022-05-12 ASSESSMENT — PAIN DESCRIPTION - LOCATION: LOCATION: BACK

## 2022-05-12 ASSESSMENT — PAIN DESCRIPTION - PAIN TYPE: TYPE: ACUTE PAIN

## 2022-05-12 NOTE — DISCHARGE SUMMARY
[facility] Discharge Summary    Date: 5/12/2022  Name: Felipe Sarkar  MRN: [id number]  YOB: 1955     Patient's PCP: No primary care provider on file. Date of Admission: 5/11/2022 to [address] [place] [address] at [facility]  Date of Discharge: 5/12/2022    Admitting Physician: Danilo Barrera MD to [address] [place] [address]  Discharge Physician: Danilo Barrera MD  Consultation: none  [facility]   Advanced directives: DNR-comfort care    Invasive procedures: none    Discharge Diagnoses:   1. Fall at home with right 11-12 posterior rib fractures secondary to gait abnormality related to glioblastoma. The patient was admitted to [address] [place] [address] for management of pain, and to arrange a safe discharge plan.  The patient's daughter-in-law is driving from [place] and should arrive May 12, 2022 in the afternoon.  Additional equipment is being ordered for the home.  I have placed discharge orders for today, when the plan is in place.  The patient is anxious to go home and Hospice will follow. Continue home Oxycodone prn pain. 2. Left frontoparietal glioblastoma multiforme diagnosed January 2021 with recurrent disease in March 2022, no longer pursuing cancer directed therapy.  Continue hospice support. 3. Gait abnormality secondary to the above with right sided weakness  4. Expressive aphasia secondary to the glioblastoma and resection  5. Hypothyroidism on replacement  6. History of seizures, manifested by staring episodes, continue Keppra  7. Spiculated 2.2 cm left lower lobe mass, this was reported on [facility] records, we do not plan additional investigation  8. Paroxysmal atrial fibrillation per [facility] records, and was started on Eliquis. Will monitor, and if continued fall episodes may need to discontinue due to risk.   9. DNR comfort care      Patient Active Problem List   Diagnosis Code    Closed fracture of 2 ribs of right side S22.41XA    Aphasia due to neuro-oncology diagnosis R47.01    Brain tumor ([facility] D49.6    Gait difficulty R26.9    Lung mass R91.8    Neoplastic malignant related fatigue R53.0    Thoracic aortic aneurysm without rupture (HCC) I71.2    Paroxysmal atrial fibrillation (HCC) I48.0    Glioblastoma multiforme (HCC) C71.9       Brief History, reason for admission:  Ramón hSi is a 74 y.o. 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 The patient had recurrent disease March 2022, and declined additional chemotherapy. She was referred for hospice services with start of hospice care March 24, 2022. Related comorbidities include expressive aphasia, gait abnormality, seizure disorder (evidently consisting of staring episodes per [facility] records). Additional medical illnesses include hypothyroidism, lung mass. The patient lives alone, with family checking in on her. She has a cane or a walker that she uses for ambulation. There was a fall a couple of weeks ago when she was putting trash in the garage without significant injury. The patient fell earlier today (May 11, 2022), and was sent to the emergency department and subsequently admitted by the hospitalist group. Hospice was not notified until after the patient was already in the inpatient unit. I was notified, and came to see her this evening.       The patient is able to provide some information despite her expressive aphasia. In the emergency department, she had multiple imaging studies that showed the left frontal mass, a spiculated left lower lobe mass, no fractures of the cervical, thoracic, lumbar spine, but there were right 11-12 posterior rib fracture.   The patient was admitted by the hospitalist with concern regarding living alone.       After hospice became aware of the admission, a phone call was made to the patient's daughter-in-law who is driving here from [place] and should arrive in the late afternoon on May 12, 2022. The hospice social worker provides information that the daughter-in-law will be staying with the patient. Arrangements will be made for hospital bed and continued hospice support with the goal for the patient to be at home.       I saw the patient at about 7 PM on May 11, 2022. I have collaborated with the hospital nurse and the hospitalist nurse practitioner. Hospice will take over the medical care at [facility].  The patient is alert, smiling. She ate supper with no dysphagia. She currently denies any pain, except for a mild headache which is evidently chronic for her. She denies any dyspnea. There is no coughing. There Is no typical anginal symptoms. She reports her bowel movements are normal. There has been 50 pounds of weight loss over the past 16 months. Hospital Course: The patient was admitted on 5/11/2022 by the hospitalist, as hospice was not notified that she was in the ED. I became aware, and saw the patient and admitted her to [address] [place] [address]  with fall at home, 2 right rib fractures and to ensure a safe discharge plan. For complete details, please see the History and Physicial. The patient was treated with continued home medications. She took one dose of Acetaminophen for pain. On 5/12/22, the patient is comfortable, awakens easily and is in no distress. She was able to sleep, and denies pain or dyspnea. No family are here. I collaborated with the patient's nurse and the hospice nurse. The patient's daughter-in-law is driving from [place] and should arrive May 12, 2022 in the afternoon.  Additional equipment is being ordered for the home. I placed discharge orders for today, when the plan is in place.  The patient is anxious to go home and Hospice will follow. Continue home Oxycodone prn pain.        I spent 45 minutes in preparation for discharge, coordination with the patient, family and the hospice nurse. Significant Diagnostic Studies:  See computerized record in Epic  CT Brain, spine 5/11/22  Head CT: Left frontal hypoattenuation along with vasogenic edema, in keeping with the patient's known history of glioblastoma.  No acute intracranial abnormality detected. Cervical spine CT: No acute fracture or traumatic malalignment. Thoracic spine CT: No acute fracture within the thoracic spine itself. However, there are acute fractures of the right 11th and 12th ribs posteriorly. There is a spiculated mass which is partially included on the field of view measuring at least 2.2 cm within the periphery of the left lower lobe. If that has not previously been worked up, a chest CT would be recommended at this time. Lumbar spine CT: No acute fracture or traumatic malalignment.      Chest X-ray  5/11/22  The rib fractures seen on the thoracic spine CT are not well visualized with radiography.  In any event, no pneumothorax is seen.  No other bony abnormalities are detected      CBC:   Recent Labs     05/11/22  1130   WBC 8.9   HGB 14.4   HCT 43.3   MCV 99.1        BMP:   Recent Labs     05/11/22  1130      K 3.8      CO2 27   BUN 17   CREATININE 0.6   GLUCOSE 97       Discharge Instructions:   Follow up:  [facility] at home   Medications: see computerized discharge medication list (has meds at home from prior to admission)  Activity: up with assist, the patient is a fall risk  Diet: regular diet  Durable Medical Equipment Needed: other per [facility]  Disposition: home with [facility]  Discharged Condition: Stable with guarded prognosis      Discharge medications   Current Discharge Medication List           Details   oxyCODONE (ROXICODONE) 5 MG immediate release tablet Take 1-2 tablets by mouth every 6 hours as needed (moderate to severe pain) for up to 30 days.   Qty: 1 tablet, Refills: 0    Comments: Reduce doses taken as pain becomes manageable  Associated Diagnoses: Closed fracture of multiple ribs of right side, initial encounter; Glioblastoma multiforme (HCC)              Details   dexamethasone (DECADRON) 2 MG tablet Take 1 tablet by mouth daily (with breakfast) for 3 days  Qty: 1 tablet, Refills: 0              Details   levETIRAcetam (KEPPRA) 500 MG tablet Take 500 mg by mouth 2 times daily      levothyroxine (SYNTHROID) 100 MCG tablet Take 100 mcg by mouth Daily      melatonin 3 MG TABS tablet Take 3 mg by mouth nightly as needed      acetaminophen (TYLENOL) 325 MG tablet Take 650 mg by mouth every 6 hours as needed for Pain      ELIQUIS 5 MG TABS tablet TAKE 1 TABLET BY MOUTH TWICE A DAY             Kathleen Farr MD, [facility]   5/12/2022

## 2022-05-12 NOTE — PLAN OF CARE
Problem: Discharge Planning  Goal: Discharge to home or other facility with appropriate resources  5/12/2022 1136 by Bonifacio Lynn RN  Outcome: Adequate for Discharge  5/11/2022 2311 by Isai Araiza LPN  Outcome: Progressing  Flowsheets  Taken 5/11/2022 1647 by Jeannette Zhou RN  Discharge to home or other facility with appropriate resources: Identify barriers to discharge with patient and caregiver  Taken 5/11/2022 1646 by Laura Miller RN  Discharge to home or other facility with appropriate resources: Identify barriers to discharge with patient and caregiver     Problem: Pain  Goal: Verbalizes/displays adequate comfort level or baseline comfort level  5/12/2022 1136 by Bonifacio Lynn RN  Outcome: Adequate for Discharge  5/11/2022 2311 by Isai Araiza LPN  Outcome: Progressing     Problem: Safety - Adult  Goal: Free from fall injury  5/12/2022 1136 by Bonifacio Lynn RN  Outcome: Adequate for Discharge  5/11/2022 2311 by Isai Araiza LPN  Outcome: Progressing     Problem: ABCDS Injury Assessment  Goal: Absence of physical injury  5/12/2022 1136 by Bonifacio Lynn RN  Outcome: Adequate for Discharge  5/11/2022 2311 by Isai Araiza LPN  Outcome: Progressing

## 2022-05-12 NOTE — PROGRESS NOTES
[address] [place] [address]  General Inpatient Hospice Progress Note    Date: 5/12/2022  Name: Dandy Mascorro  MRN: [id number]  YOB: 1955   Patient's PCP: No primary care provider on file. Admit Date: 5/11/2022 to [facility] Inpatient Hospice      Subjective: The patient is comfortable, awakens easily and is in no distress. She was able to sleep, and denies pain or dyspnea. No family are here. I collaborated with the patient's nurse and the hospice nurse. Objective:   Pain is managed with Acetaminophen, Oxycodone (at home) or Morphine IV prn with 1 dose of Morphine in the ED. .      Data reviewed 5/12/2022:  CT Brain, spine 5/11/22  Head CT: Left frontal hypoattenuation along with vasogenic edema, in keeping with the patient's known history of glioblastoma.  No acute intracranial abnormality detected. Cervical spine CT: No acute fracture or traumatic malalignment. Thoracic spine CT: No acute fracture within the thoracic spine itself. However, there are acute fractures of the right 11th and 12th ribs posteriorly. There is a spiculated mass which is partially included on the field of view measuring at least 2.2 cm within the periphery of the left lower lobe. If that has not previously been worked up, a chest CT would be recommended at this time.      Lumbar spine CT: No acute fracture or traumatic malalignment.      Chest X-ray  5/11/22  The rib fractures seen on the thoracic spine CT are not well visualized with radiography.  In any event, no pneumothorax is seen.  No other bony abnormalities are detected.      Hepatic Function Panel:          Lab Results   Component Value Date     ALKPHOS 50 05/11/2022     ALT 13 05/11/2022     AST 11 05/11/2022     PROT 6.3 05/11/2022     BILITOT 0.4 05/11/2022     LABALBU 4.2 05/11/2022      CBC:       Recent Labs     05/11/22  1130   WBC 8.9   HGB 14.4   HCT 43.3   MCV 99.1         BMP:       Recent Labs     05/11/22  1130      K 3.8   CL 107   CO2 27   BUN 17   CREATININE 0.6   GLUCOSE 97        Physical Exam:   /79   Pulse 79   Temp 96.8 °F (36 °C) (Infrared)   Resp 18   Ht 5' 5\" (1.651 m)   Wt 130 lb (59 kg)   SpO2 97%   BMI 21.63 kg/m²   General: Comfortable, expressive aphasia with slow, halting speech, but able to make her needs known, there is no acute distress  HEENT: Mucous membranes are moist, sclerae are clear, palpable left craniotomy defect,  Chest: there is some mild discomfort to palpation in the right posterior thorax   Heart: RRR, S1S2, no murmurs  Lungs:  equal breath sounds bilaterally, diminished at the bases, without rales, rhonchi   Abdomen: soft, bowel sounds present, no tenderness, nondistended,   Extremities:  No mottling, cyanosis, or edema, no palpable cords  Neurologic: there is right hemiparesis, more noticeable in the arm as the patient needs to lift her right arm with her left hand. Gait is not assessed. She does have difficulty raising the right leg off the bed. Plantar reflex is upgoing on the right. There is no clonus. There is expressive aphasia which is not new.     Assesment/Plan:    1. Fall at home with right 11-12 posterior rib fractures secondary to gait abnormality related to glioblastoma. The patient is admitted to [facility] for management of pain, and to arrange a safe discharge plan. The patient's daughter-in-law is driving from [place] and should arrive May 12, 2022 in the afternoon. Additional equipment is being ordered for the home. I will place discharge orders for today, when the plan is in place. The patient is anxious to go home and Hospice will follow. Continue home Oxycodone prn pain. 2. Left frontoparietal glioblastoma multiforme diagnosed January 2021 with recurrent disease in March 2022, no longer pursuing cancer directed therapy. Continue hospice support. 3. Gait abnormality secondary to the above with right sided weakness  4.  Expressive aphasia secondary

## 2022-05-12 NOTE — PLAN OF CARE
Problem: Discharge Planning  Goal: Discharge to home or other facility with appropriate resources  Outcome: Progressing  Flowsheets  Taken 5/11/2022 1647 by Crystal March RN  Discharge to home or other facility with appropriate resources: Identify barriers to discharge with patient and caregiver  Taken 5/11/2022 1646 by Walker Peralta RN  Discharge to home or other facility with appropriate resources: Identify barriers to discharge with patient and caregiver     Problem: Pain  Goal: Verbalizes/displays adequate comfort level or baseline comfort level  Outcome: Progressing     Problem: Safety - Adult  Goal: Free from fall injury  Outcome: Progressing     Problem: ABCDS Injury Assessment  Goal: Absence of physical injury  Outcome: Progressing

## 2022-05-12 NOTE — PROGRESS NOTES
Discharge instructions given to patient and son, all questions answered, verbalized understanding, patient taken to car in wheelchair with all belongings to be driven home by son.

## 2022-05-12 NOTE — DISCHARGE SUMMARY
V2.0  Discharge Summary    Name:  Miguel Ortega /Age/Sex: 1955 (74 y.o. female)   Admit Date: 2022  Discharge Date: 22    MRN & CSN:  [id number] & [id number] Encounter Date and Time 22 7:45 AM EDT    Attending:  Alcira Frey MD Discharging Provider: YOLANDA Olivarez NP       Hospital Course:     Brief HPI:  Miguel Ortega is a very pleasant 74 y.o. female with pmh of glioblastoma stage IV who presents with fall resulting in fracture of the 11th and 12th ribs. At this time we are uncertain of any other past medical history as her family has not arrived at this time. I did speak to her son who lives in [place] and he will be here tomorrow. I also spoke with daughter-in-law who lives in [place] and she states she will be driving up here but able to take a few days. I also contacted Zaida Medina at [facility], had to leave her a message, I did NOT receive a call back. Patient presented to the emergency department after a fall with acute on chronic back pain. Patient has baseline aphasia as well as right-sided neurodeficits from her intracranial cancer. Patient does live alone and attempts to use cane when ambulating. Patient is currently receiving hospice care for her GBM she plans no further chemo or radiation, she is a DNR CC this all has been discussed with her family who are power of . Brief Problem Based Course:   1. Fall resulting in rib fractures 11th and 12th will control pain  2. Glioblastoma stage 4 on hospice care with [facility], hospice has been consulted for inpatient care. Pt will be discharged to [facility] care. 3. History of seizures, continue current medications  4. Patient has significant expressive aphasia, will consult speech therapy.   Swallow evaluation was completed by myself and RN patient did swallow water fine she is unable to swallow food and pills, she will be placed on a diet I will order her p.o. medications at this time.   5. Hypothyroidism continue synthroid  6. PAF, continue Eliquis for now, may need to be d/cd if pt continues to fall. Pt has been discharged to the service of Dr Janice Rizo at [facility] further plans per him. The patient expressed appropriate understanding of, and agreement with the discharge recommendations, medications, and plan. Consults this admission:  Hospice    Discharge Diagnosis:   Closed fracture of multiple ribs of right side        Discharge Instruction:   Follow up appointments:   Primary care physician: No primary care provider on file. Diet: regular diet   Activity: activity as tolerated  Disposition: Discharged to:   []Home, []C, []SNF, []Acute Rehab, [x]Hospice   Condition on discharge: Stable  Labs and Tests to be Followed up as an outpatient by PCP or Specialist: Hospice care    Discharge Medications:        Medication List      START taking these medications    oxyCODONE 5 MG immediate release tablet  Commonly known as: ROXICODONE  Take 1-2 tablets by mouth every 6 hours as needed (moderate to severe pain) for up to 30 days.         CHANGE how you take these medications    dexamethasone 2 MG tablet  Commonly known as: DECADRON  Take 1 tablet by mouth daily (with breakfast) for 3 days  What changed: when to take this        CONTINUE taking these medications    acetaminophen 325 MG tablet  Commonly known as: TYLENOL     Eliquis 5 MG Tabs tablet  Generic drug: apixaban     levETIRAcetam 500 MG tablet  Commonly known as: KEPPRA     levothyroxine 100 MCG tablet  Commonly known as: SYNTHROID     melatonin 3 MG Tabs tablet           Where to Get Your Medications      Information about where to get these medications is not yet available    Ask your nurse or doctor about these medications  · dexamethasone 2 MG tablet  · oxyCODONE 5 MG immediate release tablet        Objective Findings at Discharge:   /79   Pulse 79   Temp 96.8 °F (36 °C) (Infrared)   Resp 18   Ht 5' 5\" (1.651 m) Wt 130 lb (59 kg)   SpO2 97%   BMI 21.63 kg/m²       Physical Exam:   General: NAD  Eyes: EOMI  ENT: neck supple  Cardiovascular: Regular rate. Respiratory: Clear to auscultation  Gastrointestinal: Soft, non tender  Genitourinary: no suprapubic tenderness  Musculoskeletal: No edema  Skin: warm, dry  Neuro: Alert. Psych: Mood appropriate. Labs and Imaging   CT HEAD WO CONTRAST    Result Date: 5/11/2022  EXAMINATION: CT OF THE CERVICAL SPINE WITHOUT CONTRAST; CT OF THE LUMBAR SPINE WITHOUT CONTRAST; CT OF THE THORACIC SPINE WITHOUT CONTRAST; CT OF THE HEAD WITHOUT CONTRAST 5/11/2022 8:30 am; 5/11/2022 8:31 am TECHNIQUE: CT of the cervical spine was performed without the administration of intravenous contrast. Multiplanar reformatted images are provided for review. Automated exposure control, iterative reconstruction, and/or weight based adjustment of the mA/kV was utilized to reduce the radiation dose to as low as reasonably achievable.; CT of the lumbar spine was performed without the administration of intravenous contrast. Multiplanar reformatted images are provided for review. Adjustment of mA and/or kV according to patient size was utilized. Automated exposure control, iterative reconstruction, and/or weight based adjustment of the mA/kV was utilized to reduce the radiation dose to as low as reasonably achievable.; CT of the thoracic spine was performed without the administration of intravenous contrast. Multiplanar reformatted images are provided for review. Automated exposure control, iterative reconstruction, and/or weight based adjustment of the mA/kV was utilized to reduce the radiation dose to as low as reasonably achievable.; CT of the head was performed without the administration of intravenous contrast. Automated exposure control, iterative reconstruction, and/or weight based adjustment of the mA/kV was utilized to reduce the radiation dose to as low as reasonably achievable.  COMPARISON: None. HISTORY: ORDERING SYSTEM PROVIDED HISTORY: fall from chair TECHNOLOGIST PROVIDED HISTORY: Reason for exam:->fall from chair Decision Support Exception - unselect if not a suspected or confirmed emergency medical condition->Emergency Medical Condition (MA) Reason for Exam: fall from chair Additional signs and symptoms: fall from chair; ORDERING SYSTEM PROVIDED HISTORY: fall from chair TECHNOLOGIST PROVIDED HISTORY: Reason for exam:->fall from chair Reason for Exam: fall from chair Additional signs and symptoms: fall from chair; [address] [facility]: fall, slid from chair, on eliquis, known GBM that is recurrent status post bx recently TECHNOLOGIST PROVIDED HISTORY: Reason for exam:->fall, slid from chair, on eliquis, known GBM that is recurrent status post bx recently Has a \"code stroke\" or \"stroke alert\" been called? ->No Decision Support Exception - unselect if not a suspected or confirmed emergency medical condition->Emergency Medical Condition (MA) Reason for Exam: fall, slid from chair, on eliquis, known GBM that is recurrent status post bx recently Additional signs and symptoms: fall, slid from chair, on eliquis, known GBM that is recurrent status post bx recently FINDINGS: HEAD CT: BRAIN/VENTRICLES:  No acute loss of the gray-white matter differentiation is identified to suggest acute or subacute infarct. Encephalomalacia in the left frontal lobe is noted. Hypoattenuation within the left frontal lobe is noted as well, along with vasogenic edema, in keeping with the patient's known history of glioblastoma. No evidence of midline shift. There is mild periventricular low-attenuation, compatible with chronic small vessel ischemic disease. The intracranial vasculature, including the dural venous sinuses, is within normal limits. ORBITS: No acute orbital abnormalities are identified. SINUSES: The visualized paranasal sinuses and mastoid air cells are clear.  SOFT TISSUES/SKULL: Left calvarial craniotomy noted. No acute calvarial abnormality detected. CERVICAL SPINE: BONES/ALIGNMENT: No acute fracture or traumatic malalignment. DEGENERATIVE CHANGES: Multilevel degenerative disc and facet disease noted. No high-grade central canal stenosis is found. SOFT TISSUES: There is no prevertebral soft tissue swelling. THORACIC SPINE: BONES/ALIGNMENT: No acute fracture or traumatic malalignment is seen within the thoracic spine itself. There are acute fractures in the right 11th and 12th ribs posteriorly. DEGENERATIVE CHANGES: No significant degenerative disease is seen. SOFT TISSUES: No paravertebral edema. Paraspinal muscles are symmetric. There is a spiculated mass seen within the periphery of the left lower lobe measuring at least 2.2 cm. That is not completely included on the field of view. Visualized right lung is clear. LUMBAR SPINE: BONES/ALIGNMENT: No acute fracture or traumatic malalignment is seen within the lumbar spine DEGENERATIVE CHANGES: There is mild degenerative disc disease, seen greatest L2-L3, where there is a small circumferential disc bulge. No central or foraminal stenosis. SOFT TISSUES: No paravertebral edema is identified. 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 Head CT: Left frontal hypoattenuation along with vasogenic edema, in keeping with the patient's known history of glioblastoma. No acute intracranial abnormality detected. Cervical spine CT: No acute fracture or traumatic malalignment. Thoracic spine CT: No acute fracture within the thoracic spine itself. However, there are acute fractures of the right 11th and 12th ribs posteriorly. There is a spiculated mass which is partially included on the field of view measuring at least 2.2 cm within the periphery of the left lower lobe. If that has not previously been worked up, a chest CT would be recommended at this time. Lumbar spine CT: No acute fracture or traumatic malalignment.      CT CERVICAL SPINE WO CONTRAST    Result Date: 5/11/2022  EXAMINATION: CT OF THE CERVICAL SPINE WITHOUT CONTRAST; CT OF THE LUMBAR SPINE WITHOUT CONTRAST; CT OF THE THORACIC SPINE WITHOUT CONTRAST; CT OF THE HEAD WITHOUT CONTRAST 5/11/2022 8:30 am; 5/11/2022 8:31 am TECHNIQUE: CT of the cervical spine was performed without the administration of intravenous contrast. Multiplanar reformatted images are provided for review. Automated exposure control, iterative reconstruction, and/or weight based adjustment of the mA/kV was utilized to reduce the radiation dose to as low as reasonably achievable.; CT of the lumbar spine was performed without the administration of intravenous contrast. Multiplanar reformatted images are provided for review. Adjustment of mA and/or kV according to patient size was utilized. Automated exposure control, iterative reconstruction, and/or weight based adjustment of the mA/kV was utilized to reduce the radiation dose to as low as reasonably achievable.; CT of the thoracic spine was performed without the administration of intravenous contrast. Multiplanar reformatted images are provided for review. Automated exposure control, iterative reconstruction, and/or weight based adjustment of the mA/kV was utilized to reduce the radiation dose to as low as reasonably achievable.; CT of the head was performed without the administration of intravenous contrast. Automated exposure control, iterative reconstruction, and/or weight based adjustment of the mA/kV was utilized to reduce the radiation dose to as low as reasonably achievable. COMPARISON: None.  HISTORY: ORDERING SYSTEM PROVIDED HISTORY: fall from chair TECHNOLOGIST PROVIDED HISTORY: Reason for exam:->fall from chair Decision Support Exception - unselect if not a suspected or confirmed emergency medical condition->Emergency Medical Condition (MA) Reason for Exam: fall from chair Additional signs and symptoms: fall from chair; [address] [facility] HISTORY: fall from chair TECHNOLOGIST PROVIDED HISTORY: Reason for exam:->fall from chair Reason for Exam: fall from chair Additional signs and symptoms: fall from chair; ORDERING SYSTEM PROVIDED HISTORY: fall, slid from chair, on eliquis, known GBM that is recurrent status post bx recently TECHNOLOGIST PROVIDED HISTORY: Reason for exam:->fall, slid from chair, on eliquis, known GBM that is recurrent status post bx recently Has a \"code stroke\" or \"stroke alert\" been called? ->No Decision Support Exception - unselect if not a suspected or confirmed emergency medical condition->Emergency Medical Condition (MA) Reason for Exam: fall, slid from chair, on eliquis, known GBM that is recurrent status post bx recently Additional signs and symptoms: fall, slid from chair, on eliquis, known GBM that is recurrent status post bx recently FINDINGS: HEAD CT: BRAIN/VENTRICLES:  No acute loss of the gray-white matter differentiation is identified to suggest acute or subacute infarct. Encephalomalacia in the left frontal lobe is noted. Hypoattenuation within the left frontal lobe is noted as well, along with vasogenic edema, in keeping with the patient's known history of glioblastoma. No evidence of midline shift. There is mild periventricular low-attenuation, compatible with chronic small vessel ischemic disease. The intracranial vasculature, including the dural venous sinuses, is within normal limits. ORBITS: No acute orbital abnormalities are identified. SINUSES: The visualized paranasal sinuses and mastoid air cells are clear. SOFT TISSUES/SKULL: Left calvarial craniotomy noted. No acute calvarial abnormality detected. CERVICAL SPINE: BONES/ALIGNMENT: No acute fracture or traumatic malalignment. DEGENERATIVE CHANGES: Multilevel degenerative disc and facet disease noted. No high-grade central canal stenosis is found. SOFT TISSUES: There is no prevertebral soft tissue swelling.  THORACIC SPINE: BONES/ALIGNMENT: No acute fracture or traumatic malalignment is seen within the thoracic spine itself. There are acute fractures in the right 11th and 12th ribs posteriorly. DEGENERATIVE CHANGES: No significant degenerative disease is seen. SOFT TISSUES: No paravertebral edema. Paraspinal muscles are symmetric. There is a spiculated mass seen within the periphery of the left lower lobe measuring at least 2.2 cm. That is not completely included on the field of view. Visualized right lung is clear. LUMBAR SPINE: BONES/ALIGNMENT: No acute fracture or traumatic malalignment is seen within the lumbar spine DEGENERATIVE CHANGES: There is mild degenerative disc disease, seen greatest L2-L3, where there is a small circumferential disc bulge. No central or foraminal stenosis. SOFT TISSUES: No paravertebral edema is identified. 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 Head CT: Left frontal hypoattenuation along with vasogenic edema, in keeping with the patient's known history of glioblastoma. No acute intracranial abnormality detected. Cervical spine CT: No acute fracture or traumatic malalignment. Thoracic spine CT: No acute fracture within the thoracic spine itself. However, there are acute fractures of the right 11th and 12th ribs posteriorly. There is a spiculated mass which is partially included on the field of view measuring at least 2.2 cm within the periphery of the left lower lobe. If that has not previously been worked up, a chest CT would be recommended at this time. Lumbar spine CT: No acute fracture or traumatic malalignment.      CT THORACIC SPINE WO CONTRAST    Result Date: 5/11/2022  EXAMINATION: CT OF THE CERVICAL SPINE WITHOUT CONTRAST; CT OF THE LUMBAR SPINE WITHOUT CONTRAST; CT OF THE THORACIC SPINE WITHOUT CONTRAST; CT OF THE HEAD WITHOUT CONTRAST 5/11/2022 8:30 am; 5/11/2022 8:31 am TECHNIQUE: CT of the cervical spine was performed without the administration of intravenous contrast. Multiplanar reformatted images are provided for review. Automated exposure control, iterative reconstruction, and/or weight based adjustment of the mA/kV was utilized to reduce the radiation dose to as low as reasonably achievable.; CT of the lumbar spine was performed without the administration of intravenous contrast. Multiplanar reformatted images are provided for review. Adjustment of mA and/or kV according to patient size was utilized. Automated exposure control, iterative reconstruction, and/or weight based adjustment of the mA/kV was utilized to reduce the radiation dose to as low as reasonably achievable.; CT of the thoracic spine was performed without the administration of intravenous contrast. Multiplanar reformatted images are provided for review. Automated exposure control, iterative reconstruction, and/or weight based adjustment of the mA/kV was utilized to reduce the radiation dose to as low as reasonably achievable.; CT of the head was performed without the administration of intravenous contrast. Automated exposure control, iterative reconstruction, and/or weight based adjustment of the mA/kV was utilized to reduce the radiation dose to as low as reasonably achievable. COMPARISON: None.  HISTORY: ORDERING SYSTEM PROVIDED HISTORY: fall from chair TECHNOLOGIST PROVIDED HISTORY: Reason for exam:->fall from chair Decision Support Exception - unselect if not a suspected or confirmed emergency medical condition->Emergency Medical Condition (MA) Reason for Exam: fall from chair Additional signs and symptoms: fall from chair; ORDERING SYSTEM PROVIDED HISTORY: fall from chair TECHNOLOGIST PROVIDED HISTORY: Reason for exam:->fall from chair Reason for Exam: fall from chair Additional signs and symptoms: fall from chair; [facility]: fall, slid from chair, on eliquis, known GBM that is recurrent status post bx recently TECHNOLOGIST PROVIDED HISTORY: Reason for exam:->fall, slid from chair, on eliquis, known GBM that is recurrent status post bx recently Has a \"code stroke\" or \"stroke alert\" been called? ->No Decision Support Exception - unselect if not a suspected or confirmed emergency medical condition->Emergency Medical Condition (MA) Reason for Exam: fall, slid from chair, on eliquis, known GBM that is recurrent status post bx recently Additional signs and symptoms: fall, slid from chair, on eliquis, known GBM that is recurrent status post bx recently FINDINGS: HEAD CT: BRAIN/VENTRICLES:  No acute loss of the gray-white matter differentiation is identified to suggest acute or subacute infarct. Encephalomalacia in the left frontal lobe is noted. Hypoattenuation within the left frontal lobe is noted as well, along with vasogenic edema, in keeping with the patient's known history of glioblastoma. No evidence of midline shift. There is mild periventricular low-attenuation, compatible with chronic small vessel ischemic disease. The intracranial vasculature, including the dural venous sinuses, is within normal limits. ORBITS: No acute orbital abnormalities are identified. SINUSES: The visualized paranasal sinuses and mastoid air cells are clear. SOFT TISSUES/SKULL: Left calvarial craniotomy noted. No acute calvarial abnormality detected. CERVICAL SPINE: BONES/ALIGNMENT: No acute fracture or traumatic malalignment. DEGENERATIVE CHANGES: Multilevel degenerative disc and facet disease noted. No high-grade central canal stenosis is found. SOFT TISSUES: There is no prevertebral soft tissue swelling. THORACIC SPINE: BONES/ALIGNMENT: No acute fracture or traumatic malalignment is seen within the thoracic spine itself. There are acute fractures in the right 11th and 12th ribs posteriorly. DEGENERATIVE CHANGES: No significant degenerative disease is seen. SOFT TISSUES: No paravertebral edema. Paraspinal muscles are symmetric.  There is a spiculated mass seen within the periphery of the left lower lobe measuring at least 2.2 cm.  That is not completely included on the field of view. Visualized right lung is clear. LUMBAR SPINE: BONES/ALIGNMENT: No acute fracture or traumatic malalignment is seen within the lumbar spine DEGENERATIVE CHANGES: There is mild degenerative disc disease, seen greatest L2-L3, where there is a small circumferential disc bulge. No central or foraminal stenosis. SOFT TISSUES: No paravertebral edema is identified. 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 Head CT: Left frontal hypoattenuation along with vasogenic edema, in keeping with the patient's known history of glioblastoma. No acute intracranial abnormality detected. Cervical spine CT: No acute fracture or traumatic malalignment. Thoracic spine CT: No acute fracture within the thoracic spine itself. However, there are acute fractures of the right 11th and 12th ribs posteriorly. There is a spiculated mass which is partially included on the field of view measuring at least 2.2 cm within the periphery of the left lower lobe. If that has not previously been worked up, a chest CT would be recommended at this time. Lumbar spine CT: No acute fracture or traumatic malalignment. CT LUMBAR SPINE WO CONTRAST    Result Date: 5/11/2022  EXAMINATION: CT OF THE CERVICAL SPINE WITHOUT CONTRAST; CT OF THE LUMBAR SPINE WITHOUT CONTRAST; CT OF THE THORACIC SPINE WITHOUT CONTRAST; CT OF THE HEAD WITHOUT CONTRAST 5/11/2022 8:30 am; 5/11/2022 8:31 am TECHNIQUE: CT of the cervical spine was performed without the administration of intravenous contrast. Multiplanar reformatted images are provided for review. Automated exposure control, iterative reconstruction, and/or weight based adjustment of the mA/kV was utilized to reduce the radiation dose to as low as reasonably achievable.; CT of the lumbar spine was performed without the administration of intravenous contrast. Multiplanar reformatted images are provided for review.   Adjustment of mA and/or kV according to patient size was utilized. Automated exposure control, iterative reconstruction, and/or weight based adjustment of the mA/kV was utilized to reduce the radiation dose to as low as reasonably achievable.; CT of the thoracic spine was performed without the administration of intravenous contrast. Multiplanar reformatted images are provided for review. Automated exposure control, iterative reconstruction, and/or weight based adjustment of the mA/kV was utilized to reduce the radiation dose to as low as reasonably achievable.; CT of the head was performed without the administration of intravenous contrast. Automated exposure control, iterative reconstruction, and/or weight based adjustment of the mA/kV was utilized to reduce the radiation dose to as low as reasonably achievable. COMPARISON: None. HISTORY: ORDERING SYSTEM PROVIDED HISTORY: fall from chair TECHNOLOGIST PROVIDED HISTORY: Reason for exam:->fall from chair Decision Support Exception - unselect if not a suspected or confirmed emergency medical condition->Emergency Medical Condition (MA) Reason for Exam: fall from chair Additional signs and symptoms: fall from chair; ORDERING SYSTEM PROVIDED HISTORY: fall from chair TECHNOLOGIST PROVIDED HISTORY: Reason for exam:->fall from chair Reason for Exam: fall from chair Additional signs and symptoms: fall from chair; [address] [facility]: fall, slid from chair, on eliquis, known GBM that is recurrent status post bx recently TECHNOLOGIST PROVIDED HISTORY: Reason for exam:->fall, slid from chair, on eliquis, known GBM that is recurrent status post bx recently Has a \"code stroke\" or \"stroke alert\" been called? ->No Decision Support Exception - unselect if not a suspected or confirmed emergency medical condition->Emergency Medical Condition (MA) Reason for Exam: fall, slid from chair, on eliquis, known GBM that is recurrent status post bx recently Additional signs and symptoms: fall, slid from chair, on eliquis, known GBM that is recurrent status post bx recently FINDINGS: HEAD CT: BRAIN/VENTRICLES:  No acute loss of the gray-white matter differentiation is identified to suggest acute or subacute infarct. Encephalomalacia in the left frontal lobe is noted. Hypoattenuation within the left frontal lobe is noted as well, along with vasogenic edema, in keeping with the patient's known history of glioblastoma. No evidence of midline shift. There is mild periventricular low-attenuation, compatible with chronic small vessel ischemic disease. The intracranial vasculature, including the dural venous sinuses, is within normal limits. ORBITS: No acute orbital abnormalities are identified. SINUSES: The visualized paranasal sinuses and mastoid air cells are clear. SOFT TISSUES/SKULL: Left calvarial craniotomy noted. No acute calvarial abnormality detected. CERVICAL SPINE: BONES/ALIGNMENT: No acute fracture or traumatic malalignment. DEGENERATIVE CHANGES: Multilevel degenerative disc and facet disease noted. No high-grade central canal stenosis is found. SOFT TISSUES: There is no prevertebral soft tissue swelling. THORACIC SPINE: BONES/ALIGNMENT: No acute fracture or traumatic malalignment is seen within the thoracic spine itself. There are acute fractures in the right 11th and 12th ribs posteriorly. DEGENERATIVE CHANGES: No significant degenerative disease is seen. SOFT TISSUES: No paravertebral edema. Paraspinal muscles are symmetric. There is a spiculated mass seen within the periphery of the left lower lobe measuring at least 2.2 cm. That is not completely included on the field of view. Visualized right lung is clear. LUMBAR SPINE: BONES/ALIGNMENT: No acute fracture or traumatic malalignment is seen within the lumbar spine DEGENERATIVE CHANGES: There is mild degenerative disc disease, seen greatest L2-L3, where there is a small circumferential disc bulge. No central or foraminal stenosis.  SOFT TISSUES: No paravertebral edema is identified. 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 Head CT: Left frontal hypoattenuation along with vasogenic edema, in keeping with the patient's known history of glioblastoma. No acute intracranial abnormality detected. Cervical spine CT: No acute fracture or traumatic malalignment. Thoracic spine CT: No acute fracture within the thoracic spine itself. However, there are acute fractures of the right 11th and 12th ribs posteriorly. There is a spiculated mass which is partially included on the field of view measuring at least 2.2 cm within the periphery of the left lower lobe. If that has not previously been worked up, a chest CT would be recommended at this time. Lumbar spine CT: No acute fracture or traumatic malalignment. XR CHEST PORTABLE    Result Date: 5/11/2022  EXAMINATION: ONE XRAY VIEW OF THE CHEST 5/11/2022 10:23 am COMPARISON: 01/04/2021 HISTORY: ORDERING SYSTEM PROVIDED HISTORY: fall, R sided rib fx on ct TECHNOLOGIST PROVIDED HISTORY: Reason for exam:->fall, R sided rib fx on ct Reason for Exam: fall, R sided rib fx on ct Additional signs and symptoms: fall, R sided rib fx on ct FINDINGS: The right-sided rib fracture detected on the CT is not well seen on the radiograph. No other acute bony abnormalities are identified. A pneumothorax is not identified. No focal infiltrate. Cardial pericardial silhouette unremarkable. The rib fractures seen on the thoracic spine CT are not well visualized with radiography. In any event, no pneumothorax is seen. No other bony abnormalities are detected.        CBC:   Recent Labs     05/11/22  1130   WBC 8.9   HGB 14.4        BMP:    Recent Labs     05/11/22  1130      K 3.8      CO2 27   BUN 17   CREATININE 0.6   GLUCOSE 97     Hepatic:   Recent Labs     05/11/22  1130   AST 11*   ALT 13   BILITOT 0.4   ALKPHOS 50       Time Spent Discharging patient 35minutes    Electronically signed by YOLANDA Patrick NP on 5/12/2022 at 7:45 AM